# Patient Record
Sex: MALE | Race: WHITE | NOT HISPANIC OR LATINO | Employment: OTHER | URBAN - METROPOLITAN AREA
[De-identification: names, ages, dates, MRNs, and addresses within clinical notes are randomized per-mention and may not be internally consistent; named-entity substitution may affect disease eponyms.]

---

## 2023-05-22 DIAGNOSIS — I10 PRIMARY HYPERTENSION: ICD-10-CM

## 2023-05-22 RX ORDER — LOSARTAN POTASSIUM 25 MG/1
1 TABLET ORAL DAILY
COMMUNITY
Start: 2018-06-28 | End: 2023-10-24 | Stop reason: SDUPTHER

## 2023-05-22 RX ORDER — FELODIPINE 10 MG/1
10 TABLET, EXTENDED RELEASE ORAL DAILY
Qty: 90 TABLET | Refills: 3 | Status: SHIPPED | OUTPATIENT
Start: 2023-05-22 | End: 2024-04-16 | Stop reason: ALTCHOICE

## 2023-05-22 RX ORDER — ASCORBIC ACID 125 MG
5 TABLET,CHEWABLE ORAL NIGHTLY
COMMUNITY
Start: 2020-07-17

## 2023-05-22 RX ORDER — NYSTATIN AND TRIAMCINOLONE ACETONIDE 100000; 1 [USP'U]/G; MG/G
OINTMENT TOPICAL
COMMUNITY
Start: 2022-10-07 | End: 2023-11-22 | Stop reason: SDUPTHER

## 2023-05-22 RX ORDER — TRIAMCINOLONE ACETONIDE 1 MG/G
OINTMENT TOPICAL
COMMUNITY
Start: 2021-01-12 | End: 2024-03-18 | Stop reason: ALTCHOICE

## 2023-05-22 RX ORDER — SIMVASTATIN 20 MG/1
1 TABLET, FILM COATED ORAL NIGHTLY
COMMUNITY
Start: 2019-05-22 | End: 2023-11-27 | Stop reason: SDUPTHER

## 2023-05-22 RX ORDER — GLUCOSAM/CHONDRO/HERB 149/HYAL 750-100 MG
TABLET ORAL
COMMUNITY

## 2023-05-22 RX ORDER — PNV NO.95/FERROUS FUM/FOLIC AC 28MG-0.8MG
1 TABLET ORAL DAILY
COMMUNITY
Start: 2020-07-17

## 2023-05-22 RX ORDER — GEMFIBROZIL 600 MG/1
1 TABLET, FILM COATED ORAL 2 TIMES DAILY
COMMUNITY
Start: 2012-09-11 | End: 2023-10-24 | Stop reason: SDUPTHER

## 2023-05-22 RX ORDER — NEOMYCIN SULFATE, POLYMYXIN B SULFATE AND HYDROCORTISONE 10; 3.5; 1 MG/ML; MG/ML; [USP'U]/ML
SUSPENSION/ DROPS AURICULAR (OTIC)
COMMUNITY
Start: 2022-07-22

## 2023-05-22 RX ORDER — MV-MIN/FOLIC/K1/LYCOPEN/LUTEIN 300-60 MCG
1 TABLET ORAL DAILY
COMMUNITY
Start: 2020-07-17 | End: 2024-03-18 | Stop reason: SDUPTHER

## 2023-05-22 RX ORDER — FELODIPINE 10 MG/1
1 TABLET, EXTENDED RELEASE ORAL DAILY
COMMUNITY
Start: 2019-04-15 | End: 2023-05-22 | Stop reason: SDUPTHER

## 2023-05-22 RX ORDER — METOPROLOL TARTRATE 100 MG/1
TABLET ORAL
COMMUNITY
Start: 2012-08-29 | End: 2023-11-20 | Stop reason: SDUPTHER

## 2023-05-22 RX ORDER — TOBRAMYCIN 3 MG/ML
SOLUTION/ DROPS OPHTHALMIC
COMMUNITY
Start: 2022-07-22

## 2023-07-21 ENCOUNTER — OFFICE VISIT (OUTPATIENT)
Dept: PRIMARY CARE | Facility: CLINIC | Age: 78
End: 2023-07-21
Payer: MEDICARE

## 2023-07-21 VITALS
OXYGEN SATURATION: 98 % | SYSTOLIC BLOOD PRESSURE: 128 MMHG | HEIGHT: 75 IN | WEIGHT: 187.4 LBS | BODY MASS INDEX: 23.3 KG/M2 | DIASTOLIC BLOOD PRESSURE: 72 MMHG | RESPIRATION RATE: 16 BRPM | HEART RATE: 72 BPM | TEMPERATURE: 98.1 F

## 2023-07-21 DIAGNOSIS — R73.9 HYPERGLYCEMIA: Primary | ICD-10-CM

## 2023-07-21 DIAGNOSIS — I48.91 ATRIAL FIBRILLATION, UNSPECIFIED TYPE (MULTI): ICD-10-CM

## 2023-07-21 DIAGNOSIS — L25.9 CONTACT DERMATITIS AND ECZEMA: ICD-10-CM

## 2023-07-21 DIAGNOSIS — I10 PRIMARY HYPERTENSION: ICD-10-CM

## 2023-07-21 DIAGNOSIS — Z00.00 MEDICARE ANNUAL WELLNESS VISIT, SUBSEQUENT: ICD-10-CM

## 2023-07-21 DIAGNOSIS — B35.6 TINEA CRURIS: ICD-10-CM

## 2023-07-21 DIAGNOSIS — Z12.5 SCREENING PSA (PROSTATE SPECIFIC ANTIGEN): ICD-10-CM

## 2023-07-21 PROBLEM — E78.00 HIGH CHOLESTEROL: Status: ACTIVE | Noted: 2023-07-21

## 2023-07-21 PROBLEM — H01.009 BLEPHARITIS: Status: ACTIVE | Noted: 2023-07-21

## 2023-07-21 PROBLEM — H52.7 REFRACTIVE ERROR: Status: ACTIVE | Noted: 2023-07-21

## 2023-07-21 PROBLEM — R73.09 ELEVATED GLUCOSE: Status: ACTIVE | Noted: 2023-07-21

## 2023-07-21 PROBLEM — C61 PROSTATE CANCER (MULTI): Status: ACTIVE | Noted: 2023-07-21

## 2023-07-21 PROBLEM — H11.159 PINGUECULA: Status: ACTIVE | Noted: 2023-07-21

## 2023-07-21 PROBLEM — H43.813 PVD (POSTERIOR VITREOUS DETACHMENT), BOTH EYES: Status: ACTIVE | Noted: 2023-07-21

## 2023-07-21 PROBLEM — G47.00 INSOMNIA: Status: ACTIVE | Noted: 2023-07-21

## 2023-07-21 PROBLEM — H25.13 NUCLEAR SCLEROSIS OF BOTH EYES: Status: ACTIVE | Noted: 2023-07-21

## 2023-07-21 PROBLEM — F40.243 ANXIETY WITH FLYING: Status: ACTIVE | Noted: 2023-07-21

## 2023-07-21 PROBLEM — H26.9 CATARACT: Status: ACTIVE | Noted: 2023-07-21

## 2023-07-21 PROBLEM — N18.30 CHRONIC KIDNEY DISEASE, STAGE 3 (MULTI): Status: ACTIVE | Noted: 2023-07-21

## 2023-07-21 PROBLEM — N52.9 ED (ERECTILE DYSFUNCTION): Status: ACTIVE | Noted: 2023-07-21

## 2023-07-21 PROBLEM — H53.10 EYESTRAIN, BILATERAL: Status: ACTIVE | Noted: 2023-07-21

## 2023-07-21 PROBLEM — H35.363 DRUSEN STAGE MACULAR DEGENERATION OF BOTH EYES: Status: ACTIVE | Noted: 2023-07-21

## 2023-07-21 PROBLEM — R20.0 NUMBNESS AND TINGLING: Status: ACTIVE | Noted: 2023-07-21

## 2023-07-21 PROBLEM — M19.90 OSTEOARTHRITIS: Status: ACTIVE | Noted: 2023-07-21

## 2023-07-21 PROBLEM — H11.30 SCH (SUBCONJUNCTIVAL HEMORRHAGE): Status: ACTIVE | Noted: 2023-07-21

## 2023-07-21 PROBLEM — R20.2 NUMBNESS AND TINGLING: Status: ACTIVE | Noted: 2023-07-21

## 2023-07-21 PROBLEM — C44.300 SKIN CANCER OF FACE: Status: ACTIVE | Noted: 2023-07-21

## 2023-07-21 PROBLEM — H43.399 VITREOUS FLOATERS: Status: ACTIVE | Noted: 2023-07-21

## 2023-07-21 PROCEDURE — 85027 COMPLETE CBC AUTOMATED: CPT

## 2023-07-21 PROCEDURE — G0103 PSA SCREENING: HCPCS

## 2023-07-21 PROCEDURE — 80053 COMPREHEN METABOLIC PANEL: CPT

## 2023-07-21 PROCEDURE — G0439 PPPS, SUBSEQ VISIT: HCPCS | Performed by: FAMILY MEDICINE

## 2023-07-21 PROCEDURE — 80061 LIPID PANEL: CPT

## 2023-07-21 PROCEDURE — 1036F TOBACCO NON-USER: CPT | Performed by: FAMILY MEDICINE

## 2023-07-21 PROCEDURE — 3074F SYST BP LT 130 MM HG: CPT | Performed by: FAMILY MEDICINE

## 2023-07-21 PROCEDURE — 99397 PER PM REEVAL EST PAT 65+ YR: CPT | Performed by: FAMILY MEDICINE

## 2023-07-21 PROCEDURE — 3078F DIAST BP <80 MM HG: CPT | Performed by: FAMILY MEDICINE

## 2023-07-21 PROCEDURE — 83036 HEMOGLOBIN GLYCOSYLATED A1C: CPT

## 2023-07-21 PROCEDURE — 1160F RVW MEDS BY RX/DR IN RCRD: CPT | Performed by: FAMILY MEDICINE

## 2023-07-21 PROCEDURE — 1170F FXNL STATUS ASSESSED: CPT | Performed by: FAMILY MEDICINE

## 2023-07-21 PROCEDURE — G0446 INTENS BEHAVE THER CARDIO DX: HCPCS | Performed by: FAMILY MEDICINE

## 2023-07-21 PROCEDURE — 1159F MED LIST DOCD IN RCRD: CPT | Performed by: FAMILY MEDICINE

## 2023-07-21 PROCEDURE — 99214 OFFICE O/P EST MOD 30 MIN: CPT | Performed by: FAMILY MEDICINE

## 2023-07-21 RX ORDER — HYDROCORTISONE 25 MG/G
CREAM TOPICAL 2 TIMES DAILY PRN
Qty: 453.6 G | Refills: 0 | Status: SHIPPED | OUTPATIENT
Start: 2023-07-21 | End: 2024-07-20

## 2023-07-21 RX ORDER — NYSTATIN 100000 [USP'U]/G
POWDER TOPICAL 2 TIMES DAILY
Qty: 60 G | Refills: 3 | Status: SHIPPED | OUTPATIENT
Start: 2023-07-21 | End: 2024-07-20

## 2023-07-21 ASSESSMENT — ENCOUNTER SYMPTOMS
ADENOPATHY: 0
DEPRESSION: 0
SHORTNESS OF BREATH: 0
WOUND: 0
HEMATURIA: 0
RHINORRHEA: 0
LOSS OF SENSATION IN FEET: 0
EYE PAIN: 0
HALLUCINATIONS: 0
NECK STIFFNESS: 0
BACK PAIN: 0
PALPITATIONS: 0
WEAKNESS: 0
DYSURIA: 0
OCCASIONAL FEELINGS OF UNSTEADINESS: 0
POLYDIPSIA: 0
CHILLS: 0
EYE REDNESS: 0
HEADACHES: 0
BRUISES/BLEEDS EASILY: 0
FEVER: 0
SORE THROAT: 0
BLOOD IN STOOL: 0
FATIGUE: 0
COUGH: 0
ABDOMINAL PAIN: 0

## 2023-07-21 ASSESSMENT — ACTIVITIES OF DAILY LIVING (ADL)
TAKING_MEDICATION: INDEPENDENT
MANAGING_FINANCES: INDEPENDENT
BATHING: INDEPENDENT
GROCERY_SHOPPING: INDEPENDENT
DRESSING: INDEPENDENT
DOING_HOUSEWORK: INDEPENDENT

## 2023-07-21 ASSESSMENT — PATIENT HEALTH QUESTIONNAIRE - PHQ9
2. FEELING DOWN, DEPRESSED OR HOPELESS: NOT AT ALL
SUM OF ALL RESPONSES TO PHQ9 QUESTIONS 1 AND 2: 0
1. LITTLE INTEREST OR PLEASURE IN DOING THINGS: NOT AT ALL

## 2023-07-21 NOTE — PATIENT INSTRUCTIONS
Try Oil of Olay Total Skin effects -- 7    Try COLACE (docusate) 100 mg -- start with 2 capsules daily.

## 2023-07-21 NOTE — ASSESSMENT & PLAN NOTE
For facial rash -- add hydrocortisone 2.5% cream up to twice a day (including after shaving)    STOP aftershave tonics    Can use Oil of Olay Total Skin Effects (7) once acute inflammation calms down.

## 2023-07-21 NOTE — PROGRESS NOTES
"Subjective   Reason for Visit: Jhonny Baron is an 77 y.o. male here for a Medicare Wellness visit.     Past Medical, Surgical, and Family History reviewed and updated in chart.    Reviewed all medications by prescribing practitioner or clinical pharmacist (such as prescriptions, OTCs, herbal therapies and supplements) and documented in the medical record.        Patient Care Team:  Valdo Arango MD as PCP - General     Review of Systems   Constitutional:  Negative for chills, fatigue and fever.   HENT:  Negative for rhinorrhea and sore throat.    Eyes:  Negative for pain and redness.   Respiratory:  Negative for cough and shortness of breath.    Cardiovascular:  Negative for chest pain and palpitations.   Gastrointestinal:  Negative for abdominal pain and blood in stool.   Endocrine: Negative for polydipsia and polyuria.   Genitourinary:  Negative for dysuria and hematuria.   Musculoskeletal:  Negative for back pain and neck stiffness.   Skin:  Positive for rash. Negative for wound.   Allergic/Immunologic: Negative for environmental allergies and food allergies.   Neurological:  Negative for weakness and headaches.   Hematological:  Negative for adenopathy. Does not bruise/bleed easily.   Psychiatric/Behavioral:  Negative for hallucinations and suicidal ideas.        Objective   Vitals:  /72 (BP Location: Left arm, Patient Position: Sitting, BP Cuff Size: Adult)   Pulse 72   Temp 36.7 °C (98.1 °F) (Temporal)   Resp 16   Ht 1.905 m (6' 3\")   Wt 85 kg (187 lb 6.4 oz)   SpO2 98%   BMI 23.42 kg/m²       Physical Exam  Vitals reviewed.   Constitutional:       General: He is not in acute distress.     Appearance: He is not ill-appearing.   HENT:      Head: Normocephalic and atraumatic.      Right Ear: Tympanic membrane normal.      Left Ear: Tympanic membrane normal.      Nose: No congestion or rhinorrhea.      Mouth/Throat:      Pharynx: No oropharyngeal exudate or posterior oropharyngeal erythema. "   Eyes:      Extraocular Movements: Extraocular movements intact.      Conjunctiva/sclera: Conjunctivae normal.      Pupils: Pupils are equal, round, and reactive to light.   Cardiovascular:      Rate and Rhythm: Normal rate. Rhythm irregular.      Heart sounds: No murmur heard.     No friction rub. No gallop.   Pulmonary:      Effort: Pulmonary effort is normal.      Breath sounds: Normal breath sounds. No wheezing, rhonchi or rales.   Abdominal:      General: There is no distension.      Palpations: Abdomen is soft.      Tenderness: There is no abdominal tenderness. There is no guarding or rebound.   Musculoskeletal:         General: No swelling or deformity.      Cervical back: Normal range of motion and neck supple.      Right lower leg: No edema.      Left lower leg: No edema.   Skin:     Capillary Refill: Capillary refill takes less than 2 seconds.      Coloration: Skin is not jaundiced.      Findings: Rash present.   Neurological:      General: No focal deficit present.      Mental Status: He is alert.      Motor: No weakness.   Psychiatric:         Mood and Affect: Mood normal.         Behavior: Behavior normal.         Assessment/Plan   Problem List Items Addressed This Visit       Atrial fibrillation (CMS/HCC)    Overview     AFIB -- rate controlled.   Xarelto for DOAC.   F/U cardio Dr Darnell.         Contact dermatitis and eczema    Overview     Contact dermatitis vs Xerotic eczema -- treated with oral CS 12/2020.   Per path stable with regular use of aveeno.   Using triamcinolone cream/ointment for acute flare-ups.   Continue using DOVE soap -- nothing stronger.         Current Assessment & Plan     For facial rash -- add hydrocortisone 2.5% cream up to twice a day (including after shaving)    STOP aftershave tonics    Can use Oil of Olay Total Skin Effects (7) once acute inflammation calms down.          Relevant Medications    hydrocortisone 2.5 % cream    Hyperglycemia - Primary    Current Assessment &  Plan     Monitor a1c.         Hypertension    Overview                 Current Assessment & Plan     well controlled on CCB, ACEi, BB.         Medicare annual wellness visit, subsequent    Overview     7/21/23 [facility] Preventive exam: check labs, continue healthy diet and regular exercise.  No further colonoscopies after age 75.  PSA monitored by Urology.     7/21/23 SUBSEQUENT Medicare wellness eval -- rare cigar, no falls, no depression, no dementia, no loss of adls. moderate alcohol. no opiates. only rare benzo when flying -- no longer needs this.      7/21/23 I spent 15 minutes face-to-face with this individual discussing their cardiovascular risk and behavioral therapies of nutritional choices, exercise, and elimination of habits contributing to risk. We agreed on a plan addressing reduction of their current cardiovascular risk. For patients with risk calculated >10%, aspirin use was discussed and encouraged unless known allergy or increased risk of bleeding contraindicates use. Patient's 10 year CV risk estimate calculates to: >10 %      Hx Low TASHA on screening test -- no sx of claudication. Normal pulses on exam. Hold off on formal ABIs/arterial doppler testing unless symptoms present.                 Other Visit Diagnoses       Tinea cruris        Relevant Medications    nystatin (Mycostatin) 100,000 unit/gram powder

## 2023-07-22 LAB
ALANINE AMINOTRANSFERASE (SGPT) (U/L) IN SER/PLAS: 17 U/L (ref 10–52)
ALBUMIN (G/DL) IN SER/PLAS: 5.1 G/DL (ref 3.4–5)
ALKALINE PHOSPHATASE (U/L) IN SER/PLAS: 68 U/L (ref 33–136)
ANION GAP IN SER/PLAS: 15 MMOL/L (ref 10–20)
ASPARTATE AMINOTRANSFERASE (SGOT) (U/L) IN SER/PLAS: 25 U/L (ref 9–39)
BILIRUBIN TOTAL (MG/DL) IN SER/PLAS: 1 MG/DL (ref 0–1.2)
CALCIUM (MG/DL) IN SER/PLAS: 10.3 MG/DL (ref 8.6–10.6)
CARBON DIOXIDE, TOTAL (MMOL/L) IN SER/PLAS: 24 MMOL/L (ref 21–32)
CHLORIDE (MMOL/L) IN SER/PLAS: 106 MMOL/L (ref 98–107)
CHOLESTEROL (MG/DL) IN SER/PLAS: 111 MG/DL (ref 0–199)
CHOLESTEROL IN HDL (MG/DL) IN SER/PLAS: 31.5 MG/DL
CHOLESTEROL/HDL RATIO: 3.5
CREATININE (MG/DL) IN SER/PLAS: 1.27 MG/DL (ref 0.5–1.3)
ERYTHROCYTE DISTRIBUTION WIDTH (RATIO) BY AUTOMATED COUNT: 14.6 % (ref 11.5–14.5)
ERYTHROCYTE MEAN CORPUSCULAR HEMOGLOBIN CONCENTRATION (G/DL) BY AUTOMATED: 30.9 G/DL (ref 32–36)
ERYTHROCYTE MEAN CORPUSCULAR VOLUME (FL) BY AUTOMATED COUNT: 101 FL (ref 80–100)
ERYTHROCYTES (10*6/UL) IN BLOOD BY AUTOMATED COUNT: 4.3 X10E12/L (ref 4.5–5.9)
ESTIMATED AVERAGE GLUCOSE FOR HBA1C: 140 MG/DL
GFR MALE: 57 ML/MIN/1.73M2
GLUCOSE (MG/DL) IN SER/PLAS: 103 MG/DL (ref 74–99)
HEMATOCRIT (%) IN BLOOD BY AUTOMATED COUNT: 43.4 % (ref 41–52)
HEMOGLOBIN (G/DL) IN BLOOD: 13.4 G/DL (ref 13.5–17.5)
HEMOGLOBIN A1C/HEMOGLOBIN TOTAL IN BLOOD: 6.5 %
LDL: 49 MG/DL (ref 0–99)
LEUKOCYTES (10*3/UL) IN BLOOD BY AUTOMATED COUNT: 7.8 X10E9/L (ref 4.4–11.3)
NRBC (PER 100 WBCS) BY AUTOMATED COUNT: 0 /100 WBC (ref 0–0)
PLATELETS (10*3/UL) IN BLOOD AUTOMATED COUNT: 604 X10E9/L (ref 150–450)
POTASSIUM (MMOL/L) IN SER/PLAS: 4.5 MMOL/L (ref 3.5–5.3)
PROSTATE SPECIFIC AG (NG/ML) IN SER/PLAS: <0.1 NG/ML (ref 0–4)
PROTEIN TOTAL: 7.3 G/DL (ref 6.4–8.2)
SODIUM (MMOL/L) IN SER/PLAS: 140 MMOL/L (ref 136–145)
TRIGLYCERIDE (MG/DL) IN SER/PLAS: 155 MG/DL (ref 0–149)
UREA NITROGEN (MG/DL) IN SER/PLAS: 32 MG/DL (ref 6–23)
VLDL: 31 MG/DL (ref 0–40)

## 2023-07-27 DIAGNOSIS — R73.03 PREDIABETES: ICD-10-CM

## 2023-07-28 RX ORDER — METFORMIN HYDROCHLORIDE 500 MG/1
1000 TABLET, EXTENDED RELEASE ORAL
Qty: 60 TABLET | Refills: 11 | Status: SHIPPED | OUTPATIENT
Start: 2023-07-28 | End: 2023-11-22 | Stop reason: SDUPTHER

## 2023-08-09 ENCOUNTER — TELEPHONE (OUTPATIENT)
Dept: PRIMARY CARE | Facility: CLINIC | Age: 78
End: 2023-08-09
Payer: MEDICARE

## 2023-08-09 DIAGNOSIS — E78.00 HIGH CHOLESTEROL: ICD-10-CM

## 2023-08-09 DIAGNOSIS — R73.9 HYPERGLYCEMIA: ICD-10-CM

## 2023-08-09 NOTE — TELEPHONE ENCOUNTER
Medardo Baron,  1945, was in a couple of weeks ago regarding his sugar levels and wanted to talk to you about this as well as his cholesterol levels and the possibility of doing a blood check. Please call him back at [phone]. Thanks

## 2023-08-10 NOTE — TELEPHONE ENCOUNTER
Spoke with the patient he had concerns about when to check again his A1c I advised he earliest to check will be 3 months and that will give Dr Arango an Idea if the medication is working. The patient agreed and will call back for labs later.

## 2023-08-15 LAB
CHOLESTEROL (MG/DL) IN SER/PLAS: 94 MG/DL (ref 0–199)
CHOLESTEROL IN HDL (MG/DL) IN SER/PLAS: 27.7 MG/DL
CHOLESTEROL/HDL RATIO: 3.4
LDL: 47 MG/DL (ref 0–99)
TRIGLYCERIDE (MG/DL) IN SER/PLAS: 97 MG/DL (ref 0–149)
VLDL: 19 MG/DL (ref 0–40)

## 2023-09-13 PROBLEM — L30.9 DERMATITIS, UNSPECIFIED: Status: ACTIVE | Noted: 2023-03-28

## 2023-09-13 PROBLEM — L90.5 SCAR CONDITION AND FIBROSIS OF SKIN: Status: ACTIVE | Noted: 2023-03-28

## 2023-09-13 PROBLEM — I44.0 FIRST DEGREE ATRIOVENTRICULAR BLOCK: Status: ACTIVE | Noted: 2023-09-13

## 2023-09-13 PROBLEM — L40.9 PSORIASIS: Status: ACTIVE | Noted: 2023-09-13

## 2023-09-13 PROBLEM — D48.5 NEOPLASM OF UNCERTAIN BEHAVIOR OF SKIN: Status: ACTIVE | Noted: 2023-03-28

## 2023-09-13 PROBLEM — D18.01 HEMANGIOMA OF SKIN AND SUBCUTANEOUS TISSUE: Status: ACTIVE | Noted: 2023-03-28

## 2023-09-13 PROBLEM — I87.2 CHRONIC VENOUS INSUFFICIENCY: Status: ACTIVE | Noted: 2023-03-28

## 2023-09-13 PROBLEM — Z85.828 PERSONAL HISTORY OF OTHER MALIGNANT NEOPLASM OF SKIN: Status: ACTIVE | Noted: 2023-03-28

## 2023-09-13 PROBLEM — I45.10 RIGHT BUNDLE BRANCH BLOCK: Status: ACTIVE | Noted: 2023-09-13

## 2023-09-13 PROBLEM — L71.9 ROSACEA, UNSPECIFIED: Status: ACTIVE | Noted: 2023-03-28

## 2023-09-13 PROBLEM — D22.5 MELANOCYTIC NEVI OF TRUNK: Status: ACTIVE | Noted: 2023-03-28

## 2023-09-13 PROBLEM — L57.0 ACTINIC KERATOSIS: Status: ACTIVE | Noted: 2023-03-28

## 2023-09-13 PROBLEM — L20.9 ATOPIC DERMATITIS, UNSPECIFIED: Status: ACTIVE | Noted: 2023-03-28

## 2023-09-13 RX ORDER — KETOCONAZOLE 20 MG/G
1 CREAM TOPICAL
COMMUNITY
Start: 2022-09-20 | End: 2024-03-18

## 2023-09-13 RX ORDER — CLOBETASOL PROPIONATE 0.46 MG/ML
1 SOLUTION TOPICAL
COMMUNITY
Start: 2023-03-14 | End: 2024-03-18

## 2023-09-13 RX ORDER — TALC
3 POWDER (GRAM) TOPICAL DAILY
COMMUNITY
End: 2023-11-22 | Stop reason: ALTCHOICE

## 2023-09-13 RX ORDER — METRONIDAZOLE 7.5 MG/G
1 CREAM TOPICAL
COMMUNITY
Start: 2021-10-20 | End: 2024-03-18

## 2023-09-13 RX ORDER — FLUOROURACIL 50 MG/G
1 CREAM TOPICAL
COMMUNITY
Start: 2022-01-19 | End: 2024-03-18

## 2023-10-05 ENCOUNTER — TELEPHONE (OUTPATIENT)
Dept: PRIMARY CARE | Facility: CLINIC | Age: 78
End: 2023-10-05
Payer: MEDICARE

## 2023-10-05 NOTE — TELEPHONE ENCOUNTER
Patient left message re: prescription you are working on to get approved ? I looked in his chart but did not see anything. I called patient and l/m that you would be out until Monday and that you would address then or he could call me back and I can look into it for him.

## 2023-10-24 DIAGNOSIS — E78.00 HIGH CHOLESTEROL: Primary | ICD-10-CM

## 2023-10-24 RX ORDER — LOSARTAN POTASSIUM 25 MG/1
25 TABLET ORAL DAILY
Qty: 90 TABLET | Refills: 3 | Status: SHIPPED | OUTPATIENT
Start: 2023-10-24

## 2023-10-24 RX ORDER — GEMFIBROZIL 600 MG/1
600 TABLET, FILM COATED ORAL 2 TIMES DAILY
Qty: 90 TABLET | Refills: 3 | Status: SHIPPED | OUTPATIENT
Start: 2023-10-24 | End: 2024-04-29 | Stop reason: SDUPTHER

## 2023-10-25 ENCOUNTER — OFFICE VISIT (OUTPATIENT)
Dept: OPHTHALMOLOGY | Facility: CLINIC | Age: 78
End: 2023-10-25
Payer: MEDICARE

## 2023-10-25 DIAGNOSIS — H35.363 DEGENERATIVE RETINAL DRUSEN OF BOTH EYES: Primary | ICD-10-CM

## 2023-10-25 DIAGNOSIS — H25.13 NUCLEAR SCLEROSIS OF BOTH EYES: ICD-10-CM

## 2023-10-25 DIAGNOSIS — H52.13 MYOPIA OF BOTH EYES: ICD-10-CM

## 2023-10-25 DIAGNOSIS — H35.363 DRUSEN STAGE MACULAR DEGENERATION OF BOTH EYES: ICD-10-CM

## 2023-10-25 DIAGNOSIS — H43.393 VITREOUS FLOATERS OF BOTH EYES: ICD-10-CM

## 2023-10-25 DIAGNOSIS — H52.4 PRESBYOPIA OF BOTH EYES: ICD-10-CM

## 2023-10-25 PROCEDURE — 92015 DETERMINE REFRACTIVE STATE: CPT | Performed by: OPTOMETRIST

## 2023-10-25 PROCEDURE — 92014 COMPRE OPH EXAM EST PT 1/>: CPT | Performed by: OPTOMETRIST

## 2023-10-25 PROCEDURE — 92134 CPTRZ OPH DX IMG PST SGM RTA: CPT | Mod: BILATERAL PROCEDURE | Performed by: OPTOMETRIST

## 2023-10-25 ASSESSMENT — CONF VISUAL FIELD
OS_SUPERIOR_TEMPORAL_RESTRICTION: 0
OD_INFERIOR_NASAL_RESTRICTION: 0
OD_SUPERIOR_TEMPORAL_RESTRICTION: 0
OS_INFERIOR_TEMPORAL_RESTRICTION: 0
OD_NORMAL: 1
OS_NORMAL: 1
OS_SUPERIOR_NASAL_RESTRICTION: 0
OD_SUPERIOR_NASAL_RESTRICTION: 0
OS_INFERIOR_NASAL_RESTRICTION: 0
OD_INFERIOR_TEMPORAL_RESTRICTION: 0

## 2023-10-25 ASSESSMENT — ENCOUNTER SYMPTOMS
ENDOCRINE NEGATIVE: 0
GASTROINTESTINAL NEGATIVE: 0
EYES NEGATIVE: 0
RESPIRATORY NEGATIVE: 0
MUSCULOSKELETAL NEGATIVE: 0
PSYCHIATRIC NEGATIVE: 0
CARDIOVASCULAR NEGATIVE: 0
NEUROLOGICAL NEGATIVE: 0
ALLERGIC/IMMUNOLOGIC NEGATIVE: 0
HEMATOLOGIC/LYMPHATIC NEGATIVE: 0
CONSTITUTIONAL NEGATIVE: 0

## 2023-10-25 ASSESSMENT — EXTERNAL EXAM - LEFT EYE: OS_EXAM: NORMAL

## 2023-10-25 ASSESSMENT — REFRACTION_WEARINGRX
OS_SPHERE: -1.50
OD_CYLINDER: -2.25
OS_CYLINDER: -2.25
OS_AXIS: 077
OS_ADD: +2.75
OD_AXIS: 110
OD_SPHERE: -1.50
OD_ADD: +2.75

## 2023-10-25 ASSESSMENT — CUP TO DISC RATIO
OS_RATIO: 0.5
OD_RATIO: 0.4

## 2023-10-25 ASSESSMENT — REFRACTION_MANIFEST
OD_SPHERE: -1.00
OD_CYLINDER: -2.25
OD_AXIS: 110
OS_SPHERE: -1.25
OD_ADD: +2.75
OS_AXIS: 077
OS_ADD: +2.75
OS_CYLINDER: -2.25

## 2023-10-25 ASSESSMENT — VISUAL ACUITY
OD_CC: 20/30
OS_CC+: -2
OS_CC: 20/20
OD_CC+: -2
METHOD: SNELLEN - LINEAR

## 2023-10-25 ASSESSMENT — TONOMETRY
OS_IOP_MMHG: 16
IOP_METHOD: GOLDMANN APPLANATION
OD_IOP_MMHG: 16

## 2023-10-25 ASSESSMENT — SLIT LAMP EXAM - LIDS
COMMENTS: DERMATOCHALASIS - UPPER LID, BLEPHARITIS
COMMENTS: DERMATOCHALASIS - UPPER LID, BLEPHARITIS

## 2023-10-25 ASSESSMENT — EXTERNAL EXAM - RIGHT EYE: OD_EXAM: NORMAL

## 2023-10-25 NOTE — PROGRESS NOTES
Va corrects to 20/25 OD and 20/20 OS and was 20/20 right eye (OD)/left eye (OS) and minor cataracts will mild PSC OS new and may have caused mild refractive change OS as well as very minor drusen stage AMD OS>OD.  No need for AREDS 2 at this stage does not neet AREDS criteria.  A spectacle prescription was dispensed to be used as needed. Optical coherence tomography of the macula revealed:   OD: Normal foveal contour, photoreceptor, retinal pigment epithelium, IS/OS junction, central field 280 was 278 micron.  Vitreous hyaloid base not visualized.  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  OS:  Normal foveal contour, photoreceptor, retinal pigment epithelium, IS/OS junction, central field 286 was 286 micron.  Vitreous hyaloid base not visualized.  Findings are normal.  One single drusen observed temporal to fovea.  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  RNFL OCT was done as well and nl.  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    Discussed can DC AREDS 2 vits.  A spectacle prescription was dispensed to be used as needed.  The patient was asked to return to our clinic in one year or sooner if ocular or vision changes occur.

## 2023-10-31 ENCOUNTER — OFFICE VISIT (OUTPATIENT)
Dept: DERMATOLOGY | Facility: CLINIC | Age: 78
End: 2023-10-31
Payer: MEDICARE

## 2023-10-31 DIAGNOSIS — L30.9 DERMATITIS: Primary | ICD-10-CM

## 2023-10-31 PROCEDURE — 3075F SYST BP GE 130 - 139MM HG: CPT | Performed by: NURSE PRACTITIONER

## 2023-10-31 PROCEDURE — 3078F DIAST BP <80 MM HG: CPT | Performed by: NURSE PRACTITIONER

## 2023-10-31 PROCEDURE — 99213 OFFICE O/P EST LOW 20 MIN: CPT | Performed by: NURSE PRACTITIONER

## 2023-10-31 PROCEDURE — 1160F RVW MEDS BY RX/DR IN RCRD: CPT | Performed by: NURSE PRACTITIONER

## 2023-10-31 PROCEDURE — 1036F TOBACCO NON-USER: CPT | Performed by: NURSE PRACTITIONER

## 2023-10-31 PROCEDURE — 1159F MED LIST DOCD IN RCRD: CPT | Performed by: NURSE PRACTITIONER

## 2023-10-31 ASSESSMENT — DERMATOLOGY QUALITY OF LIFE (QOL) ASSESSMENT
RATE HOW BOTHERED YOU ARE BY EFFECTS OF YOUR SKIN PROBLEMS ON YOUR ACTIVITIES (EG, GOING OUT, ACCOMPLISHING WHAT YOU WANT, WORK ACTIVITIES OR YOUR RELATIONSHIPS WITH OTHERS): 1
RATE HOW EMOTIONALLY BOTHERED YOU ARE BY YOUR SKIN PROBLEM (FOR EXAMPLE, WORRY, EMBARRASSMENT, FRUSTRATION): 1
WHAT SINGLE SKIN CONDITION LISTED BELOW IS THE PATIENT ANSWERING THE QUALITY-OF-LIFE ASSESSMENT QUESTIONS ABOUT: NONE OF THE ABOVE
RATE HOW BOTHERED YOU ARE BY SYMPTOMS OF YOUR SKIN PROBLEM (EG, ITCHING, STINGING BURNING, HURTING OR SKIN IRRITATION): 3

## 2023-10-31 ASSESSMENT — ITCH NUMERIC RATING SCALE: HOW SEVERE IS YOUR ITCHING?: 4

## 2023-10-31 ASSESSMENT — PATIENT GLOBAL ASSESSMENT (PGA): PATIENT GLOBAL ASSESSMENT: PATIENT GLOBAL ASSESSMENT:  2 - MILD

## 2023-10-31 NOTE — PROGRESS NOTES
Subjective     Jhonny Baron is a 77 y.o. male who presents for the following: Rash.     Review of Systems:  No other skin or systemic complaints other than what is documented elsewhere in the note.    The following portions of the chart were reviewed this encounter and updated as appropriate:   Tobacco  Allergies  Meds  Problems  Med Hx  Surg Hx         Skin Cancer History  No skin cancer on file.      Specialty Problems          Dermatology Problems    Actinic keratosis    Atopic dermatitis, unspecified    Dermatitis, unspecified    Hemangioma of skin and subcutaneous tissue    Melanocytic nevi of trunk    Neoplasm of uncertain behavior of skin    Personal history of other malignant neoplasm of skin    Rosacea, unspecified    Scar condition and fibrosis of skin    Contact dermatitis and eczema     Contact dermatitis vs Xerotic eczema -- treated with oral CS 12/2020.   Per path stable with regular use of aveeno.   Using triamcinolone cream/ointment for acute flare-ups.   Continue using DOVE soap -- nothing stronger.         Skin cancer of face    Psoriasis        Objective   Well appearing patient in no apparent distress; mood and affect are within normal limits.    A focused skin examination was performed. All findings within normal limits unless otherwise noted below.    Assessment/Plan   1. Dermatitis  Head - Anterior (Face), Left Popliteal Fossa, Right Popliteal Fossa  [place] area has mild redness to more pink mixture of papules and patches    This is a 77 y.o. male  following up for dermatitis. Face is ~50% better. Patient is content. Itch is more 2/10 per patient right now. He is applying HTC 2.5% cream every other day but is too busy to apply daily. He is content with control. Discussed given him stronger TCS to desonide, but patient declined. Advised areas on popliteal fossa 2/2 to dermatitis as well and can apply HTC 2.5% cream there as well. He verbalized understanding and agreement. RTC in 4-5  months for routine skin check.

## 2023-11-20 DIAGNOSIS — I10 PRIMARY HYPERTENSION: Primary | ICD-10-CM

## 2023-11-20 RX ORDER — METOPROLOL TARTRATE 100 MG/1
50 TABLET ORAL 2 TIMES DAILY
Qty: 30 TABLET | Refills: 11 | Status: SHIPPED | OUTPATIENT
Start: 2023-11-20 | End: 2024-11-19

## 2023-11-22 ENCOUNTER — LAB (OUTPATIENT)
Dept: LAB | Facility: LAB | Age: 78
End: 2023-11-22
Payer: MEDICARE

## 2023-11-22 ENCOUNTER — OFFICE VISIT (OUTPATIENT)
Dept: PRIMARY CARE | Facility: CLINIC | Age: 78
End: 2023-11-22
Payer: MEDICARE

## 2023-11-22 VITALS
TEMPERATURE: 98.1 F | DIASTOLIC BLOOD PRESSURE: 70 MMHG | BODY MASS INDEX: 21.25 KG/M2 | OXYGEN SATURATION: 98 % | RESPIRATION RATE: 16 BRPM | HEART RATE: 62 BPM | SYSTOLIC BLOOD PRESSURE: 130 MMHG | WEIGHT: 170 LBS

## 2023-11-22 DIAGNOSIS — C61 PROSTATE CANCER (MULTI): ICD-10-CM

## 2023-11-22 DIAGNOSIS — R73.03 PREDIABETES: ICD-10-CM

## 2023-11-22 DIAGNOSIS — I10 PRIMARY HYPERTENSION: ICD-10-CM

## 2023-11-22 DIAGNOSIS — E11.9 TYPE 2 DIABETES MELLITUS WITHOUT COMPLICATION, WITHOUT LONG-TERM CURRENT USE OF INSULIN (MULTI): Primary | ICD-10-CM

## 2023-11-22 DIAGNOSIS — R63.4 WEIGHT LOSS: ICD-10-CM

## 2023-11-22 DIAGNOSIS — R73.9 HYPERGLYCEMIA: ICD-10-CM

## 2023-11-22 DIAGNOSIS — E11.9 TYPE 2 DIABETES MELLITUS WITHOUT COMPLICATION, WITHOUT LONG-TERM CURRENT USE OF INSULIN (MULTI): ICD-10-CM

## 2023-11-22 DIAGNOSIS — L30.9 DERMATITIS, UNSPECIFIED: ICD-10-CM

## 2023-11-22 DIAGNOSIS — E78.00 HIGH CHOLESTEROL: ICD-10-CM

## 2023-11-22 LAB
ALBUMIN SERPL BCP-MCNC: 4.9 G/DL (ref 3.4–5)
ALP SERPL-CCNC: 73 U/L (ref 33–136)
ALT SERPL W P-5'-P-CCNC: 16 U/L (ref 10–52)
ANION GAP SERPL CALC-SCNC: 13 MMOL/L (ref 10–20)
AST SERPL W P-5'-P-CCNC: 23 U/L (ref 9–39)
BILIRUB SERPL-MCNC: 0.9 MG/DL (ref 0–1.2)
BUN SERPL-MCNC: 29 MG/DL (ref 6–23)
CALCIUM SERPL-MCNC: 9.8 MG/DL (ref 8.6–10.3)
CHLORIDE SERPL-SCNC: 107 MMOL/L (ref 98–107)
CO2 SERPL-SCNC: 27 MMOL/L (ref 21–32)
CREAT SERPL-MCNC: 1.08 MG/DL (ref 0.5–1.3)
EST. AVERAGE GLUCOSE BLD GHB EST-MCNC: 123 MG/DL
GFR SERPL CREATININE-BSD FRML MDRD: 69 ML/MIN/1.73M*2
GLUCOSE SERPL-MCNC: 120 MG/DL (ref 74–99)
HBA1C MFR BLD: 5.9 %
POTASSIUM SERPL-SCNC: 4.3 MMOL/L (ref 3.5–5.3)
PROT SERPL-MCNC: 7.1 G/DL (ref 6.4–8.2)
SODIUM SERPL-SCNC: 143 MMOL/L (ref 136–145)
TSH SERPL-ACNC: 1.9 MIU/L (ref 0.44–3.98)
VIT B12 SERPL-MCNC: 470 PG/ML (ref 211–911)

## 2023-11-22 PROCEDURE — 84443 ASSAY THYROID STIM HORMONE: CPT

## 2023-11-22 PROCEDURE — 82607 VITAMIN B-12: CPT

## 2023-11-22 PROCEDURE — 80053 COMPREHEN METABOLIC PANEL: CPT

## 2023-11-22 PROCEDURE — 36415 COLL VENOUS BLD VENIPUNCTURE: CPT

## 2023-11-22 PROCEDURE — 1036F TOBACCO NON-USER: CPT | Performed by: FAMILY MEDICINE

## 2023-11-22 PROCEDURE — 83036 HEMOGLOBIN GLYCOSYLATED A1C: CPT

## 2023-11-22 PROCEDURE — 1159F MED LIST DOCD IN RCRD: CPT | Performed by: FAMILY MEDICINE

## 2023-11-22 PROCEDURE — 3075F SYST BP GE 130 - 139MM HG: CPT | Performed by: FAMILY MEDICINE

## 2023-11-22 PROCEDURE — 99214 OFFICE O/P EST MOD 30 MIN: CPT | Performed by: FAMILY MEDICINE

## 2023-11-22 PROCEDURE — 1160F RVW MEDS BY RX/DR IN RCRD: CPT | Performed by: FAMILY MEDICINE

## 2023-11-22 PROCEDURE — 3078F DIAST BP <80 MM HG: CPT | Performed by: FAMILY MEDICINE

## 2023-11-22 RX ORDER — NYSTATIN AND TRIAMCINOLONE ACETONIDE 100000; 1 [USP'U]/G; MG/G
OINTMENT TOPICAL
Qty: 15 G | Refills: 1 | Status: SHIPPED | OUTPATIENT
Start: 2023-11-22 | End: 2024-03-22 | Stop reason: SDUPTHER

## 2023-11-22 RX ORDER — METFORMIN HYDROCHLORIDE 500 MG/1
500 TABLET, EXTENDED RELEASE ORAL
Qty: 90 TABLET | Refills: 3 | Status: SHIPPED | OUTPATIENT
Start: 2023-11-22 | End: 2024-06-03 | Stop reason: SDUPTHER

## 2023-11-22 NOTE — PROGRESS NOTES
"Subjective   Patient ID: Jhonny Baron is a 78 y.o. male who presents for Follow-up (DM).    Increased eczema flare following recent COVID infection 2 weeks ago.  Cough resolving.  No SOB.    Losing weight 17# since summer -- 10% of body weight.   Getting regular exercise and watching sugar intake since dx DM 7/2023 with A1c 6.5% and started metformin.   No gi side effects.     Objective   Visit Vitals  /70 (BP Location: Right arm, Patient Position: Sitting, BP Cuff Size: Adult)   Pulse 62   Temp 36.7 °C (98.1 °F) (Temporal)   Resp 16   Wt 77.1 kg (170 lb)   SpO2 98%   BMI 21.25 kg/m²   Smoking Status Former   BSA 2.02 m²      O: VSS AFEB Awake, Alert, NAD.  No intoxication, withdrawal, or sedation.  Chest CTA.  Heart RRR.  Ext no c/c/e.      Lab Results   Component Value Date    WBC 7.8 07/21/2023    HGB 13.4 (L) 07/21/2023    HCT 43.4 07/21/2023     (H) 07/21/2023     (H) 07/21/2023       Chemistry    Lab Results   Component Value Date/Time     07/21/2023 1329    K 4.5 07/21/2023 1329     07/21/2023 1329    CO2 24 07/21/2023 1329    BUN 32 (H) 07/21/2023 1329    CREATININE 1.27 07/21/2023 1329    Lab Results   Component Value Date/Time    CALCIUM 10.3 07/21/2023 1329    ALKPHOS 68 07/21/2023 1329    AST 25 07/21/2023 1329    ALT 17 07/21/2023 1329    BILITOT 1.0 07/21/2023 1329          Lab Results   Component Value Date    CHOL 94 08/15/2023    CHOL 111 07/21/2023    CHOL 101 07/22/2022     Lab Results   Component Value Date    HDL 27.7 (A) 08/15/2023    HDL 31.5 (A) 07/21/2023    HDL 30.3 (A) 07/22/2022     Lab Results   Component Value Date    LDLCALC 63 (L) 01/16/2018     Lab Results   Component Value Date    TRIG 97 08/15/2023    TRIG 155 (H) 07/21/2023    TRIG 93 02/22/2022     No components found for: \"CHOLHDL\"   Lab Results   Component Value Date    HGBA1C 6.5 (A) 07/21/2023       Assessment/Plan   Problem List Items Addressed This Visit       High cholesterol    Overview "     Stable on simvastatin, gemfibrozil.         Current Assessment & Plan     Continue current meds.          Type 2 diabetes mellitus without complication, without long-term current use of insulin (CMS/[facility]) - Primary    Overview     Dx 7/2023 with A1c 6.5%  Metformin started 7/2023 and tolerating well.   11/2023 Cut metformin down to 1 a day d/t weight loss.          Current Assessment & Plan     Weight loss on Metformin starteed 7/2023  Monitor a1c.         Hypertension    Overview     CCB, ACEi, BB            Current Assessment & Plan     well controlled  -- continue current CCB, ACEi, BB.         Prostate cancer (CMS/[facility])    Overview     12/2006 s/p prostatectomy ([place])         Dermatitis, unspecified     Other Visit Diagnoses       Weight loss        Prediabetes

## 2023-11-27 DIAGNOSIS — E78.00 HIGH CHOLESTEROL: ICD-10-CM

## 2023-11-27 RX ORDER — SIMVASTATIN 20 MG/1
20 TABLET, FILM COATED ORAL NIGHTLY
Qty: 90 TABLET | Refills: 3 | Status: SHIPPED | OUTPATIENT
Start: 2023-11-27

## 2024-03-16 NOTE — PROGRESS NOTES
Subjective      Chief Complaint   Patient presents with    Atrial Fibrillation     6 month f/u          He does have a history atrial fibrillation as well as hypertension.  He is on Xarelto for embolic protection.  Pulled a groin muscle a month ago.  He is not complaining of chest discomfort.  NO PND or orthopnea.  The legs are not swelling on him.  He does not complain of palpitations.  The afib seems to be controlled. No dizziness or lightheadedness.           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    Atrial fibrillation (Multi) 07/21/2023    Blepharitis 07/21/2023    Cataract 07/21/2023    Chronic kidney disease, stage 3 (Multi) 07/21/2023    Contact dermatitis and eczema 07/21/2023    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 07/21/2023    ED (erectile dysfunction) 07/21/2023    Eyestrain, bilateral 07/21/2023    High cholesterol 07/21/2023    Type 2 diabetes mellitus without complication, without long-term current use of insulin (Multi) 07/21/2023    Hypertension 07/21/2023    Insomnia 07/21/2023    Nuclear sclerosis of both eyes 07/21/2023    Osteoarthritis 07/21/2023    Numbness and tingling 07/21/2023    Pinguecula 07/21/2023    Prostate cancer (Multi) 07/21/2023    PVD (posterior vitreous detachment), both eyes 07/21/2023    Refractive error 07/21/2023    OLIVE (subconjunctival hemorrhage) 07/21/2023    Skin cancer of face 07/21/2023    Vitreous floaters 07/21/2023    Medicare annual wellness visit, subsequent 07/21/2023    Atopic dermatitis, unspecified 03/28/2023    Chronic venous  insufficiency 03/28/2023    First degree atrioventricular block 09/13/2023    Hemangioma of skin and subcutaneous tissue 03/28/2023    Melanocytic nevi of trunk 03/28/2023    Neoplasm of uncertain behavior of skin 03/28/2023    Actinic keratosis 03/28/2023    Personal history of other malignant neoplasm of skin 03/28/2023    Dermatitis, unspecified 03/28/2023    Psoriasis 09/13/2023    Scar condition and fibrosis of skin 03/28/2023    Right bundle branch block 09/13/2023    Rosacea, unspecified 03/28/2023    Presbyopia of both eyes 10/25/2023    Myopia of both eyes 10/25/2023    Non-recurrent unilateral inguinal hernia without obstruction or gangrene 04/05/2024    Ataxia 04/16/2024    Gait instability 08/27/2024    Chronic eczematous otitis externa of both ears 02/19/2025     Resolved Ambulatory Problems     Diagnosis Date Noted    Left inguinal hernia 03/22/2024     Past Medical History:   Diagnosis Date    A-fib (Multi)     Alcohol abuse, uncomplicated     Anxiety disorder, unspecified     Dermatochalasis of unspecified eye, unspecified eyelid 01/06/2020    Dermatochalasis of unspecified eye, unspecified eyelid 01/06/2020    Dermatochalasis of unspecified eye, unspecified eyelid 01/06/2020    DM type 2 (diabetes mellitus, type 2) (Multi)     HTN (hypertension)     Male erectile dysfunction, unspecified     Other conditions influencing health status     Other specified diseases of liver     Palpitations     Personal history of other diseases of male genital organs     Personal history of other diseases of the circulatory system     Personal history of other endocrine, nutritional and metabolic disease     Pinguecula, right eye 01/06/2020    Pinguecula, right eye 01/06/2020    Pinguecula, right eye 01/06/2020    Pure hypercholesterolemia, unspecified 07/22/2022        Visit Vitals  /76   Pulse 62   Wt 81.6 kg (180 lb)   SpO2 98%   BMI 22.50 kg/m²   Smoking Status Former   BSA 2.08 m²        Objective  "    Constitutional:       Appearance: Healthy appearance.   Eyes:      Pupils: Pupils are equal, round, and reactive to light.   Neck:      Vascular: JVD normal.   Pulmonary:      Breath sounds: Normal breath sounds.   Cardiovascular:      PMI at left midclavicular line. Normal rate. Irregularly irregular rhythm. Normal S1. Normal S2.       Murmurs: There is a grade 1to 2/6 systolic murmur.      No gallop.  No click. No rub.   Pulses:     Intact distal pulses.   Edema:     Peripheral edema absent.   Abdominal:      Palpations: Abdomen is soft.      Tenderness: There is no abdominal tenderness.   Musculoskeletal:      Extremities: No clubbing present.Skin:     General: Skin is warm and dry.   Neurological:      General: No focal deficit present.            Lab Review:         Lab Results   Component Value Date    CHOL 120 08/27/2024    CHOL 94 08/15/2023    CHOL 111 07/21/2023     Lab Results   Component Value Date    HDL 37.7 08/27/2024    HDL 27.7 (A) 08/15/2023    HDL 31.5 (A) 07/21/2023     Lab Results   Component Value Date    LDLCALC 63 (L) 01/16/2018     Lab Results   Component Value Date    TRIG 97 08/15/2023    TRIG 155 (H) 07/21/2023    TRIG 93 02/22/2022     No components found for: \"CHOLHDL\"     Assessment/Plan     Atrial fibrillation (Multi)  Is doing well and the afib is controlled.  Is on the xarelto    Hypertension  Is dong well     "

## 2024-03-18 ENCOUNTER — OFFICE VISIT (OUTPATIENT)
Dept: CARDIOLOGY | Facility: CLINIC | Age: 79
End: 2024-03-18
Payer: MEDICARE

## 2024-03-18 VITALS
BODY MASS INDEX: 22.5 KG/M2 | DIASTOLIC BLOOD PRESSURE: 76 MMHG | WEIGHT: 180 LBS | SYSTOLIC BLOOD PRESSURE: 138 MMHG | OXYGEN SATURATION: 98 % | HEART RATE: 62 BPM

## 2024-03-18 DIAGNOSIS — I10 PRIMARY HYPERTENSION: ICD-10-CM

## 2024-03-18 DIAGNOSIS — I48.21 PERMANENT ATRIAL FIBRILLATION (MULTI): Primary | ICD-10-CM

## 2024-03-18 PROCEDURE — 1159F MED LIST DOCD IN RCRD: CPT | Performed by: INTERNAL MEDICINE

## 2024-03-18 PROCEDURE — 1036F TOBACCO NON-USER: CPT | Performed by: INTERNAL MEDICINE

## 2024-03-18 PROCEDURE — 99213 OFFICE O/P EST LOW 20 MIN: CPT | Performed by: INTERNAL MEDICINE

## 2024-03-18 PROCEDURE — 1125F AMNT PAIN NOTED PAIN PRSNT: CPT | Performed by: INTERNAL MEDICINE

## 2024-03-18 PROCEDURE — 3075F SYST BP GE 130 - 139MM HG: CPT | Performed by: INTERNAL MEDICINE

## 2024-03-18 PROCEDURE — 1157F ADVNC CARE PLAN IN RCRD: CPT | Performed by: INTERNAL MEDICINE

## 2024-03-18 PROCEDURE — 3078F DIAST BP <80 MM HG: CPT | Performed by: INTERNAL MEDICINE

## 2024-03-18 ASSESSMENT — PAIN SCALES - GENERAL: PAINLEVEL: 5

## 2024-03-22 ENCOUNTER — OFFICE VISIT (OUTPATIENT)
Dept: PRIMARY CARE | Facility: CLINIC | Age: 79
End: 2024-03-22
Payer: MEDICARE

## 2024-03-22 VITALS
WEIGHT: 180.4 LBS | DIASTOLIC BLOOD PRESSURE: 72 MMHG | BODY MASS INDEX: 22.55 KG/M2 | TEMPERATURE: 97.9 F | RESPIRATION RATE: 16 BRPM | HEART RATE: 73 BPM | SYSTOLIC BLOOD PRESSURE: 132 MMHG | OXYGEN SATURATION: 98 %

## 2024-03-22 DIAGNOSIS — L30.9 DERMATITIS, UNSPECIFIED: ICD-10-CM

## 2024-03-22 DIAGNOSIS — K40.90 LEFT INGUINAL HERNIA: Primary | ICD-10-CM

## 2024-03-22 PROCEDURE — 3078F DIAST BP <80 MM HG: CPT | Performed by: FAMILY MEDICINE

## 2024-03-22 PROCEDURE — 1159F MED LIST DOCD IN RCRD: CPT | Performed by: FAMILY MEDICINE

## 2024-03-22 PROCEDURE — 3075F SYST BP GE 130 - 139MM HG: CPT | Performed by: FAMILY MEDICINE

## 2024-03-22 PROCEDURE — 1157F ADVNC CARE PLAN IN RCRD: CPT | Performed by: FAMILY MEDICINE

## 2024-03-22 PROCEDURE — 1036F TOBACCO NON-USER: CPT | Performed by: FAMILY MEDICINE

## 2024-03-22 PROCEDURE — 99214 OFFICE O/P EST MOD 30 MIN: CPT | Performed by: FAMILY MEDICINE

## 2024-03-22 RX ORDER — HYDROCORTISONE 25 MG/G
OINTMENT TOPICAL 2 TIMES DAILY PRN
Qty: 454 G | Refills: 0 | Status: SHIPPED | OUTPATIENT
Start: 2024-03-22 | End: 2025-03-22

## 2024-03-22 RX ORDER — NYSTATIN AND TRIAMCINOLONE ACETONIDE 100000; 1 [USP'U]/G; MG/G
OINTMENT TOPICAL
Qty: 60 G | Refills: 1 | Status: SHIPPED | OUTPATIENT
Start: 2024-03-22

## 2024-03-22 NOTE — PROGRESS NOTES
"Subjective   Patient ID: Jhonny Baron is a 78 y.o. male who presents for Groin Pain (Since the mid feb.).    Started after sitting on hard/uncomfortable folding chair for hours and helping another man move a table.   Progerssive pain -- worse when rising after sitting for any extended period of time.  Getting out of car.     No pain when lying flat.      Objective   Visit Vitals  /72 (BP Location: Left arm, Patient Position: Sitting, BP Cuff Size: Child)   Pulse 73   Temp 36.6 °C (97.9 °F) (Temporal)   Resp 16   Wt 81.8 kg (180 lb 6.4 oz)   SpO2 98%   BMI 22.55 kg/m²   Smoking Status Former   BSA 2.08 m²      O: VSS AFEB Awake, Alert, NAD.  No intoxication, withdrawal, or sedation.  Chest CTA.  Heart RRR 2/6 JUSTIN.  Ext no c/c/e.    Dermatitis in groin and face.     Left inguinal hernia.  Reducible but reproduces his pain.     Lab Results   Component Value Date    WBC 7.8 07/21/2023    HGB 13.4 (L) 07/21/2023    HCT 43.4 07/21/2023     (H) 07/21/2023     (H) 07/21/2023       Chemistry    Lab Results   Component Value Date/Time     11/22/2023 0949    K 4.3 11/22/2023 0949     11/22/2023 0949    CO2 27 11/22/2023 0949    BUN 29 (H) 11/22/2023 0949    CREATININE 1.08 11/22/2023 0949    Lab Results   Component Value Date/Time    CALCIUM 9.8 11/22/2023 0949    ALKPHOS 73 11/22/2023 0949    AST 23 11/22/2023 0949    ALT 16 11/22/2023 0949    BILITOT 0.9 11/22/2023 0949          Lab Results   Component Value Date    CHOL 94 08/15/2023    CHOL 111 07/21/2023    CHOL 101 07/22/2022     Lab Results   Component Value Date    HDL 27.7 (A) 08/15/2023    HDL 31.5 (A) 07/21/2023    HDL 30.3 (A) 07/22/2022     Lab Results   Component Value Date    LDLCALC 63 (L) 01/16/2018     Lab Results   Component Value Date    TRIG 97 08/15/2023    TRIG 155 (H) 07/21/2023    TRIG 93 02/22/2022     No components found for: \"CHOLHDL\"   Lab Results   Component Value Date    HGBA1C 5.9 (H) 11/22/2023 "       Assessment/Plan   Problem List Items Addressed This Visit       Dermatitis, unspecified    Overview     Tinea cruris on chronic eczema         Current Assessment & Plan      -- treat with mycolog ointment prior to surgery.          Relevant Medications    nystatin-triamcinolone (Mycolog II) ointment    Left inguinal hernia - Primary    Overview     Onset 2/2024.         Current Assessment & Plan     Reviewed instructions to go to ER in case of incarceration.   Refer to Dr Ahumada at [facility].         Relevant Orders    Referral to General Surgery

## 2024-03-22 NOTE — Clinical Note
Heather -- patient with new Left Inguinal hernia.  Reducible.  Patient lives in [place].  Hopes to get in to see you at [facility]. Royce THAPA

## 2024-03-22 NOTE — ASSESSMENT & PLAN NOTE
Reviewed instructions to go to ER in case of incarceration.   Refer to Dr Ahumada at [facility].

## 2024-03-26 ENCOUNTER — APPOINTMENT (OUTPATIENT)
Dept: DERMATOLOGY | Facility: CLINIC | Age: 79
End: 2024-03-26
Payer: MEDICARE

## 2024-04-02 ENCOUNTER — OFFICE VISIT (OUTPATIENT)
Dept: DERMATOLOGY | Facility: CLINIC | Age: 79
End: 2024-04-02
Payer: MEDICARE

## 2024-04-02 DIAGNOSIS — Z85.828 HISTORY OF NONMELANOMA SKIN CANCER: ICD-10-CM

## 2024-04-02 DIAGNOSIS — L82.1 SEBORRHEIC KERATOSIS: ICD-10-CM

## 2024-04-02 DIAGNOSIS — D48.5 NEOPLASM OF UNCERTAIN BEHAVIOR OF SKIN: Primary | ICD-10-CM

## 2024-04-02 DIAGNOSIS — L81.4 LENTIGO: ICD-10-CM

## 2024-04-02 DIAGNOSIS — D69.2 SENILE PURPURA (CMS-HCC): ICD-10-CM

## 2024-04-02 DIAGNOSIS — L90.5 SCAR CONDITIONS AND FIBROSIS OF SKIN: ICD-10-CM

## 2024-04-02 DIAGNOSIS — L57.9 SKIN CHANGES DUE TO CHRONIC EXPOSURE TO NONIONIZING RADIATION: ICD-10-CM

## 2024-04-02 DIAGNOSIS — D18.01 ANGIOMA OF SKIN: ICD-10-CM

## 2024-04-02 DIAGNOSIS — L30.9 DERMATITIS: ICD-10-CM

## 2024-04-02 DIAGNOSIS — D22.9 BENIGN NEVUS: ICD-10-CM

## 2024-04-02 PROCEDURE — 1157F ADVNC CARE PLAN IN RCRD: CPT | Performed by: NURSE PRACTITIONER

## 2024-04-02 PROCEDURE — 88305 TISSUE EXAM BY PATHOLOGIST: CPT | Performed by: DERMATOLOGY

## 2024-04-02 PROCEDURE — 1159F MED LIST DOCD IN RCRD: CPT | Performed by: NURSE PRACTITIONER

## 2024-04-02 PROCEDURE — 11103 TANGNTL BX SKIN EA SEP/ADDL: CPT | Performed by: NURSE PRACTITIONER

## 2024-04-02 PROCEDURE — 11102 TANGNTL BX SKIN SINGLE LES: CPT | Performed by: NURSE PRACTITIONER

## 2024-04-02 PROCEDURE — 1160F RVW MEDS BY RX/DR IN RCRD: CPT | Performed by: NURSE PRACTITIONER

## 2024-04-02 PROCEDURE — 1036F TOBACCO NON-USER: CPT | Performed by: NURSE PRACTITIONER

## 2024-04-02 PROCEDURE — 99213 OFFICE O/P EST LOW 20 MIN: CPT | Performed by: NURSE PRACTITIONER

## 2024-04-02 NOTE — PATIENT INSTRUCTIONS

## 2024-04-02 NOTE — PROGRESS NOTES
Subjective     Jhonny Baron is a 78 y.o. male who presents for the following: Skin Check.     Review of Systems:  No other skin or systemic complaints other than what is documented elsewhere in the note.    The following portions of the chart were reviewed this encounter and updated as appropriate:   Tobacco  Allergies  Meds  Problems  Med Hx  Surg Hx         Skin Cancer History  No skin cancer on file.      Specialty Problems          Dermatology Problems    Actinic keratosis    Atopic dermatitis, unspecified    Dermatitis, unspecified     Tinea cruris on chronic eczema         Hemangioma of skin and subcutaneous tissue    Melanocytic nevi of trunk    Neoplasm of uncertain behavior of skin    Personal history of other malignant neoplasm of skin    Rosacea, unspecified    Scar condition and fibrosis of skin    Contact dermatitis and eczema     Contact dermatitis vs Xerotic eczema -- treated with oral CS 12/2020.   Per path stable with regular use of aveeno.   Using triamcinolone cream/ointment for acute flare-ups.   Continue using DOVE soap -- nothing stronger.         Skin cancer of face    Psoriasis        Objective   Well appearing patient in no apparent distress; mood and affect are within normal limits.    A focused skin examination was performed neck up and arms. All findings within normal limits unless otherwise noted below.    Assessment/Plan   1. Neoplasm of uncertain behavior of skin (2)  Mid Parietal Scalp  5 mm erythematous crusty papule          Lesion biopsy  Type of biopsy: tangential    Informed consent: discussed and consent obtained    Timeout: patient name, date of birth, surgical site, and procedure verified    Procedure prep:  Patient was prepped and draped  Anesthesia: the lesion was anesthetized in a standard fashion    Anesthetic:  1% lidocaine plain local infiltration  Instrument used: DermaBlade    Hemostasis achieved with: electrodesiccation    Outcome: patient tolerated procedure  well    Post-procedure details: wound care instructions given    Additional details:  Cleaned area with isopropyl alcohol prior to anesthesia or biopsy. Applied thin layer of vaseline and covered with bandaid after procedure      Staff Communication: Dermatology Local Anesthesia: 1 % Plain Lidocaine - Amount:0.5 ml per lesion    Specimen 1 - Dermatopathology- DERM LAB  Differential Diagnosis: NMSC vs HAK  Check Margins Yes/No?:    Comments:    Dermpath Lab: Routine Histopathology (formalin-fixed tissue)    Mid Parietal Scalp  15 x 9 mm erythematous crusty plaque          Lesion biopsy  Type of biopsy: tangential    Informed consent: discussed and consent obtained    Timeout: patient name, date of birth, surgical site, and procedure verified    Procedure prep:  Patient was prepped and draped  Anesthesia: the lesion was anesthetized in a standard fashion    Anesthetic:  1% lidocaine plain local infiltration  Instrument used: DermaBlade    Hemostasis achieved with: electrodesiccation    Outcome: patient tolerated procedure well    Post-procedure details: wound care instructions given    Additional details:  Cleaned area with isopropyl alcohol prior to anesthesia or biopsy. Applied thin layer of vaseline and covered with bandaid after procedure      Staff Communication: Dermatology Local Anesthesia: 1 % Plain Lidocaine - Amount:0.5 ml per lesion    Specimen 2 - Dermatopathology- DERM LAB  Differential Diagnosis: NMSC vs Pityriasis amiantacea vs Dermatitis vs Psoriasis  Check Margins Yes/No?:    Comments:    Dermpath Lab: Routine Histopathology (formalin-fixed tissue)    NUB  - Given uncertainty in clinical diagnosis, shave biopsy is recommended in clinic today.  - The patient expressed understanding, is in agreement with this plan, and wishes to proceed with biopsy.  - Oral and written wound care instructions provided.  - Advised patient that the office will call within 2 weeks to discuss biopsy results.      Related  Procedures  Follow Up In Dermatology - Established Patient    2. Angioma of skin  Scattered cherry-red papule(s).    A cherry hemangioma is a small macule (small, flat, smooth area) or papule (small, solid bump) formed from an overgrowth of tiny blood vessels in the skin. Cherry hemangiomas are characteristically red or purplish in color. They often first appear in middle adulthood and usually increase in number with age. Cherry hemangiomas are noncancerous (benign) and are common in adults.    The present appearance of the lesion is not worrisome but it should continue to be observed and testing/treatment may be warranted if change occurs.    Related Procedures  Follow Up In Dermatology - Established Patient    3. Benign nevus  Scattered, uniform and benign-appearing, regular brown melanocytic papules and macules.    The present appearance of the lesion is not worrisome but it should continue to be observed and testing/treatment may be warranted if change occurs.    Related Procedures  Follow Up In Dermatology - Established Patient    4. Seborrheic keratosis  Stuck on verrucous, tan-brown papules and plaques.      Seborrheic keratoses are common noncancerous (benign) growths of unknown cause seen in adults due to a thickening of an area of the top skin layer. Seborrheic keratoses may appear as if they are stuck on to the skin. They have distinct borders, and they may appear as papules (small, solid bumps) or plaques (solid, raised patches that are bigger than a thumbnail). They may be the same color as your skin, or they may be pink, light brown, darker brown, or very dark brown, or sometimes may appear black.    There is no way to prevent new seborrheic keratoses from forming. Seborrheic keratoses can be removed, but removal is considered a cosmetic issue and is usually not covered by insurance.    PLAN  No treatment is needed unless there is irritation from clothing, such as itching or bleeding.  2.   Some  lotions containing alpha hydroxy acids, salicylic acid, or urea may make the areas feel smoother with regular use but will not eliminate them.    Related Procedures  Follow Up In Dermatology - Established Patient    5. Lentigo  Scattered tan macules in sun-exposed areas.    A solar lentigo (plural, solar lentigines), also known as a sun-induced freckle or senile lentigo, is a dark (hyperpigmented) lesion caused by natural or artificial ultraviolet (UV) light. Solar lentigines may be single or multiple. This type of lentigo is different from a simple lentigo (lentigo simplex) because it is caused by exposure to UV light. Solar lentigines are benign, but they do indicate excessive sun exposure, a risk factor for the development of skin cancer.    To prevent solar lentigines, avoid exposure to sunlight in midday (10 AM to 3 PM), wear sun-protective clothing (tightly woven clothes and hats), and apply sunscreen (SPF 30 UVA and UVB block).    The present appearance of the lesion is not worrisome but it should continue to be observed and testing/treatment may be warranted if change occurs.    Related Procedures  Follow Up In Dermatology - Established Patient    6. Scar conditions and fibrosis of skin  Well healed scar at the site(s) of prior treatment with no evidence of recurrence.          The scar is clear, there is no evidence of recurrence.  The present appearance of the scar is not worrisome but it should continue to be observed and testing/treatment may be warranted if change occurs.      Related Procedures  Follow Up In Dermatology - Established Patient    7. History of nonmelanoma skin cancer    ABCDEs of melanoma and atypical moles were discussed with the patient.    Patient was instructed to perform monthly self skin examination.  We recommended that the patient have regular full skin exams given an increased risk of subsequent skin cancers.    The patient was instructed to use sun protective behaviors  including use of broad spectrum sunscreens and sun protective clothing to reduce risk of skin cancers.    Warning signs of non-melanoma skin cancer discussed.    Related Procedures  Follow Up In Dermatology - Established Patient    8. Skin changes due to chronic exposure to nonionizing radiation  Actinic changes in the form of freckles, lentigines and hyper/hypopigmentation     ABCDEs of melanoma and atypical moles were discussed with the patient.    Patient was instructed to perform monthly self skin examination.  We recommended that the patient have regular full skin exams given an increased risk of subsequent skin cancers.    The patient was instructed to use sun protective behaviors including use of broad spectrum sunscreens and sun protective clothing to reduce risk of skin cancers.    Warning signs of non-melanoma skin cancer discussed.    Related Procedures  Follow Up In Dermatology - Established Patient    9. Dermatitis  Head - Anterior (Face), Left Popliteal Fossa, Right Popliteal Fossa  Erythematous semi scaly macules and patches. For face, mostly to the beard area.      Patient reports now having itchy red areas to the popliteal fossa. Patient has ongoing dermatitis to the face, likely 2/2 to shaving but overall improved compared to previous exam with significant reduction in surface area involved and more pink then red. Continue with avoiding using aftershave. Discussed gentle cleansing practices and may continue to use HTC cream BID PRN. Advised ok to use TAC on the popliteal area as directed by Dr. Arango and continue with PRN use (currently using 1-2 times a week at most.     Related Procedures  Follow Up In Dermatology - Established Patient    10. Senile purpura (CMS/HCC) (2)  Left Forearm - Posterior, Right Forearm - Posterior  Maroon to purple macules and patches on the forearms/shins    Solar purpura refers to red blood cells leaking into previously sun-damaged skin. It is usually on the arms  and the backs of the hands in older adults. The skin appears to be bruised with dark red to purple coloration. The bruised appearance may not heal for 2 or 3 weeks.    Solar purpura is a harmless, benign condition. Further exposure to the sun should be prevented by using sunscreen and covering exposed skin.    Minimize trauma to the skin whenever possible; consider a double layer of clothing to protect the forearms and anterior shins. Men's long athletic socks with the feet cut off at the ankle can provide a protective layer for the arms and legs when around the house.    Related Procedures  Follow Up In Dermatology - Established Patient        Return in 6 months for routine skin check or return to clinic sooner if needed

## 2024-04-04 LAB
LABORATORY COMMENT REPORT: NORMAL
PATH REPORT.FINAL DX SPEC: NORMAL
PATH REPORT.GROSS SPEC: NORMAL
PATH REPORT.MICROSCOPIC SPEC OTHER STN: NORMAL
PATH REPORT.RELEVANT HX SPEC: NORMAL
PATH REPORT.TOTAL CANCER: NORMAL

## 2024-04-05 ENCOUNTER — TELEPHONE (OUTPATIENT)
Dept: DERMATOLOGY | Facility: CLINIC | Age: 79
End: 2024-04-05
Payer: MEDICARE

## 2024-04-05 ENCOUNTER — HOSPITAL ENCOUNTER (OUTPATIENT)
Facility: HOSPITAL | Age: 79
Setting detail: OUTPATIENT SURGERY
End: 2024-04-05
Attending: SURGERY | Admitting: SURGERY
Payer: MEDICARE

## 2024-04-05 DIAGNOSIS — K40.90 NON-RECURRENT UNILATERAL INGUINAL HERNIA WITHOUT OBSTRUCTION OR GANGRENE: ICD-10-CM

## 2024-04-05 DIAGNOSIS — L08.9 INFECTION OF SKIN AND SUBCUTANEOUS TISSUE: Primary | ICD-10-CM

## 2024-04-05 RX ORDER — DOXYCYCLINE 100 MG/1
CAPSULE ORAL
Qty: 14 CAPSULE | Refills: 0 | Status: SHIPPED | OUTPATIENT
Start: 2024-04-05

## 2024-04-05 NOTE — TELEPHONE ENCOUNTER
Pt called in regards to his prescription for Doxycycline Monohydrate. Would like to discuss medication more before he starts taking it. Give Mr. Baron a call back thank you.  Information has been provided to Patient.

## 2024-04-08 NOTE — PROGRESS NOTES
Subjective   Patient ID: Jhonny Baron is a 78 y.o. male who presents for No chief complaint on file..  HPI    Review of Systems    Objective   Physical Exam    Assessment/Plan   {Assess/PlanSmartLinks:51935}         Heather Ahumada MD 04/08/24 12:33 PM

## 2024-04-09 ENCOUNTER — APPOINTMENT (OUTPATIENT)
Dept: SURGERY | Facility: CLINIC | Age: 79
End: 2024-04-09
Payer: MEDICARE

## 2024-04-15 ENCOUNTER — TELEPHONE (OUTPATIENT)
Dept: PRIMARY CARE | Facility: CLINIC | Age: 79
End: 2024-04-15
Payer: MEDICARE

## 2024-04-15 NOTE — TELEPHONE ENCOUNTER
Pt's wife called in right after pt called requesting a call from someone to talk about the concerns she has with the pt. Her biggest concern is him being confused.

## 2024-04-15 NOTE — TELEPHONE ENCOUNTER
Pt called stating he left a vm today for melanie, I informed the pt the days that he is in the office. I offered to leave a message pt told me to just leave a message that he called and he will call again tomorrow.

## 2024-04-16 ENCOUNTER — OFFICE VISIT (OUTPATIENT)
Dept: PRIMARY CARE | Facility: CLINIC | Age: 79
End: 2024-04-16
Payer: MEDICARE

## 2024-04-16 ENCOUNTER — LAB (OUTPATIENT)
Dept: LAB | Facility: LAB | Age: 79
End: 2024-04-16
Payer: MEDICARE

## 2024-04-16 VITALS — SYSTOLIC BLOOD PRESSURE: 102 MMHG | DIASTOLIC BLOOD PRESSURE: 56 MMHG

## 2024-04-16 DIAGNOSIS — K52.9 GASTROENTERITIS: ICD-10-CM

## 2024-04-16 DIAGNOSIS — R27.0 ATAXIA: ICD-10-CM

## 2024-04-16 DIAGNOSIS — I10 PRIMARY HYPERTENSION: Primary | ICD-10-CM

## 2024-04-16 DIAGNOSIS — I10 PRIMARY HYPERTENSION: ICD-10-CM

## 2024-04-16 DIAGNOSIS — I95.9 HYPOTENSION, UNSPECIFIED HYPOTENSION TYPE: ICD-10-CM

## 2024-04-16 LAB
ALBUMIN SERPL BCP-MCNC: 4.1 G/DL (ref 3.4–5)
ALP SERPL-CCNC: 73 U/L (ref 33–136)
ALT SERPL W P-5'-P-CCNC: 19 U/L (ref 10–52)
ANION GAP SERPL CALC-SCNC: 17 MMOL/L (ref 10–20)
AST SERPL W P-5'-P-CCNC: 23 U/L (ref 9–39)
BILIRUB SERPL-MCNC: 1.7 MG/DL (ref 0–1.2)
BUN SERPL-MCNC: 25 MG/DL (ref 6–23)
CALCIUM SERPL-MCNC: 8.9 MG/DL (ref 8.6–10.3)
CHLORIDE SERPL-SCNC: 103 MMOL/L (ref 98–107)
CO2 SERPL-SCNC: 24 MMOL/L (ref 21–32)
CREAT SERPL-MCNC: 1.36 MG/DL (ref 0.5–1.3)
EGFRCR SERPLBLD CKD-EPI 2021: 53 ML/MIN/1.73M*2
ERYTHROCYTE [DISTWIDTH] IN BLOOD BY AUTOMATED COUNT: 14.3 % (ref 11.5–14.5)
GLUCOSE SERPL-MCNC: 149 MG/DL (ref 74–99)
HCT VFR BLD AUTO: 40.3 % (ref 41–52)
HGB BLD-MCNC: 13.1 G/DL (ref 13.5–17.5)
LACTATE SERPL-SCNC: 1.9 MMOL/L (ref 0.4–2)
MCH RBC QN AUTO: 29.7 PG (ref 26–34)
MCHC RBC AUTO-ENTMCNC: 32.5 G/DL (ref 32–36)
MCV RBC AUTO: 91 FL (ref 80–100)
NRBC BLD-RTO: 0 /100 WBCS (ref 0–0)
PLATELET # BLD AUTO: 704 X10*3/UL (ref 150–450)
POTASSIUM SERPL-SCNC: 4.3 MMOL/L (ref 3.5–5.3)
PROT SERPL-MCNC: 6.5 G/DL (ref 6.4–8.2)
RBC # BLD AUTO: 4.41 X10*6/UL (ref 4.5–5.9)
SODIUM SERPL-SCNC: 140 MMOL/L (ref 136–145)
WBC # BLD AUTO: 14.5 X10*3/UL (ref 4.4–11.3)

## 2024-04-16 PROCEDURE — 85027 COMPLETE CBC AUTOMATED: CPT

## 2024-04-16 PROCEDURE — 1160F RVW MEDS BY RX/DR IN RCRD: CPT | Performed by: FAMILY MEDICINE

## 2024-04-16 PROCEDURE — 80053 COMPREHEN METABOLIC PANEL: CPT

## 2024-04-16 PROCEDURE — 36415 COLL VENOUS BLD VENIPUNCTURE: CPT

## 2024-04-16 PROCEDURE — 83605 ASSAY OF LACTIC ACID: CPT

## 2024-04-16 PROCEDURE — 86709 HEPATITIS A IGM ANTIBODY: CPT

## 2024-04-16 PROCEDURE — 1157F ADVNC CARE PLAN IN RCRD: CPT | Performed by: FAMILY MEDICINE

## 2024-04-16 PROCEDURE — 1159F MED LIST DOCD IN RCRD: CPT | Performed by: FAMILY MEDICINE

## 2024-04-16 PROCEDURE — 3078F DIAST BP <80 MM HG: CPT | Performed by: FAMILY MEDICINE

## 2024-04-16 PROCEDURE — 99214 OFFICE O/P EST MOD 30 MIN: CPT | Performed by: FAMILY MEDICINE

## 2024-04-16 PROCEDURE — 3074F SYST BP LT 130 MM HG: CPT | Performed by: FAMILY MEDICINE

## 2024-04-16 PROCEDURE — 1036F TOBACCO NON-USER: CPT | Performed by: FAMILY MEDICINE

## 2024-04-16 NOTE — ASSESSMENT & PLAN NOTE
Persistent LE Paresthesias -- no loss of monofilament sensation. suspect intermittent nerve compression. As long as numbness does not persist will hold off on EMG testing.

## 2024-04-16 NOTE — PROGRESS NOTES
"Subjective   Patient ID: Jhonny Baron is a 78 y.o. male who presents for C/O CONFUSION following acute GE thought from food poisoning.  N/v/d/.  No bleeding.  No Fever.  Off and on confusion and unsteady gait.  Disoriented to what was going on --not forgetful per se.      Diarrhea resolved.  Then the confusion started.       Objective   Visit Vitals  /56   Smoking Status Former      O: VSS AFEB Awake, Alert, NAD.  Mildly ataxic.  No bizarre activity.  Mildly confused.  No intoxication, withdrawal, or sedation.  Chest CTA.  Heart RRR.  Ext no c/c/e.    No focal neuro deficits.   No cerebellar dysfunction.     Lab Results   Component Value Date    WBC 7.8 07/21/2023    HGB 13.4 (L) 07/21/2023    HCT 43.4 07/21/2023     (H) 07/21/2023     (H) 07/21/2023       Chemistry    Lab Results   Component Value Date/Time     11/22/2023 0949    K 4.3 11/22/2023 0949     11/22/2023 0949    CO2 27 11/22/2023 0949    BUN 29 (H) 11/22/2023 0949    CREATININE 1.08 11/22/2023 0949    Lab Results   Component Value Date/Time    CALCIUM 9.8 11/22/2023 0949    ALKPHOS 73 11/22/2023 0949    AST 23 11/22/2023 0949    ALT 16 11/22/2023 0949    BILITOT 0.9 11/22/2023 0949          Lab Results   Component Value Date    CHOL 94 08/15/2023    CHOL 111 07/21/2023    CHOL 101 07/22/2022     Lab Results   Component Value Date    HDL 27.7 (A) 08/15/2023    HDL 31.5 (A) 07/21/2023    HDL 30.3 (A) 07/22/2022     Lab Results   Component Value Date    LDLCALC 63 (L) 01/16/2018     Lab Results   Component Value Date    TRIG 97 08/15/2023    TRIG 155 (H) 07/21/2023    TRIG 93 02/22/2022     No components found for: \"CHOLHDL\"   Lab Results   Component Value Date    HGBA1C 5.9 (H) 11/22/2023       Assessment/Plan   Problem List Items Addressed This Visit       Hypertension - Primary    Current Assessment & Plan     Low BP -- stop CCB plendil.  Continue current  ACEi, BB.         Relevant Orders    Comprehensive Metabolic " Panel    CBC    Ataxia    Overview     4/16/24 Ataxia and mild confusion/disorientation following viral AGE.          Current Assessment & Plan     Check labs.  Push high sodium soups, cracker, drinking water and gatorade.

## 2024-04-17 DIAGNOSIS — K52.9 GASTROENTERITIS: Primary | ICD-10-CM

## 2024-04-17 LAB — HAV IGM SER QL: NONREACTIVE

## 2024-04-19 NOTE — PROGRESS NOTES
Subjective   Patient ID: Jhonny Baron is a 78 y.o. male who presents for hernia evaluation.  HPI  Here for evaluation of suspected left inguinal hernia. No imaging patient indicates about 1-1/2 months ago he was sitting in a meeting and then noticed the discomfort.  He has had bulging intermittently.  He has some twinges on the left.  No symptoms on the right.  He does believe he has had a hernia repair in the past.  Unsure where.  Patient's weight has been essentially stable.  He has had prostate surgery with a low midline incision.    Review of Systems   Constitutional: Negative.  Negative for appetite change, fever and unexpected weight change.   HENT: Negative.  Negative for congestion and trouble swallowing.    Eyes: Negative.    Respiratory: Negative.  Negative for cough and shortness of breath.    Cardiovascular:  Negative for chest pain and palpitations.   Gastrointestinal:  Negative for abdominal pain, diarrhea and nausea.   Endocrine: Negative.    Genitourinary: Negative.  Negative for difficulty urinating and dysuria.   Musculoskeletal:  Negative for back pain and gait problem.   Skin:  Negative for color change and rash.   Allergic/Immunologic: Negative.    Neurological: Negative.  Negative for dizziness, speech difficulty and headaches.   Hematological:  Does not bruise/bleed easily.   Psychiatric/Behavioral:  Negative for confusion.      Past Surgical History:   Procedure Laterality Date    COLONOSCOPY N/A 2018    [facility]    COLONOSCOPY N/A 2006    Complete Colonoscopy    HERNIA REPAIR      Hernia Repair    OTHER SURGICAL HISTORY  11/27/2012    Renal Endoscopy Through Nephrostomy With Calculus Removal    PROSTATE SURGERY  11/27/2012    Prostate Surgery    TONSILLECTOMY  11/27/2012    Tonsillectomy     Social History:  Tobacco Use - former  Do you use any recreational drugs - no  Alcohol Use - occasional beer  Caffeine Intake - daily- coffee, tea, and soda  Working - retired  Marital status -  "  Living with - wife  No Known Allergies  Family History   Problem Relation Name Age of Onset    Hypertension Mother      Hypertension Father       Past Medical History:   Diagnosis Date    Alcohol abuse, uncomplicated     Alcohol abuse, episodic    Anxiety disorder, unspecified     Anxiety    Dermatochalasis of unspecified eye, unspecified eyelid 01/06/2020    Dermatochalasis    Dermatochalasis of unspecified eye, unspecified eyelid 01/06/2020    Dermatochalasis    Dermatochalasis of unspecified eye, unspecified eyelid 01/06/2020    Dermatochalasis    Male erectile dysfunction, unspecified     Inability to attain erection    Other conditions influencing health status     Prostate Cancer    Other specified diseases of liver     Hepatic cyst    Palpitations     Palpitations    Personal history of other diseases of male genital organs     History of benign prostatic hypertrophy    Personal history of other diseases of the circulatory system     History of hypertension    Personal history of other endocrine, nutritional and metabolic disease     History of hypercholesterolemia    Pinguecula, right eye 01/06/2020    Pinguecula of right eye    Pinguecula, right eye 01/06/2020    Pinguecula of right eye    Pinguecula, right eye 01/06/2020    Pinguecula of right eye    Pure hypercholesterolemia, unspecified 07/22/2022    High cholesterol          Objective   Physical Exam  /78 (BP Location: Right arm, Patient Position: Sitting)   Pulse 58   Ht 1.905 m (6' 3\")   Wt 83.7 kg (184 lb 9.6 oz)   SpO2 98%   BMI 23.07 kg/m²    GENERAL APPEARANCE: Patient appears in no acute distress.   EYES: Sclera non-icteric, PERRLA.   ENT Normal appearance of ears and nose.   NECK/THYROID: Neck: no masses. Thyroid: no masses.   LYMPH NODES: No cervical or supraclavicular lymphadenopathy.   CARDIOVASCULAR Heart: RRR, patient with a positive heart murmur on exam carotid bruits: none; Peripheral edema: none.   RESPIRATORY: Lungs: " clear to auscultation bilaterally; no respiratory distress.   GI (ABDOMEN) No intraabdominal mass appreciated, no hepatosplenomegaly; Hernia: Patient with an obvious left inguinal hernia with Valsalva.  It is reducible.  Patient on the right side with no evidence of hernia.  I do not see any scars to indicate previous inguinal hernia repair patient does have a low midline scar from previous prostate surgery without any evidence of hernia here.; Tenderness: none.   PSYCH: Patient oriented to time, place and person, normal affect.    Assessment/Plan   Problem List Items Addressed This Visit             ICD-10-CM    Left inguinal hernia - Primary K40.90     Patient with an obvious left inguinal hernia.  Patient with a low midline scar secondary to prostate surgery making laparoscopic repair trans abdominal extraperitoneal not viable option.  Recommend open left inguinal hernia repair with mesh.  Risk benefits alternatives of doing the surgery were thoroughly discussed with the patient agrees to proceed                 Heather Ahumada MD 04/25/24 1:01 PM

## 2024-04-23 ENCOUNTER — LAB (OUTPATIENT)
Dept: LAB | Facility: LAB | Age: 79
End: 2024-04-23
Payer: MEDICARE

## 2024-04-23 DIAGNOSIS — K52.9 GASTROENTERITIS: ICD-10-CM

## 2024-04-23 LAB
ALBUMIN SERPL BCP-MCNC: 4 G/DL (ref 3.4–5)
ALP SERPL-CCNC: 74 U/L (ref 33–136)
ALT SERPL W P-5'-P-CCNC: 14 U/L (ref 10–52)
ANION GAP SERPL CALC-SCNC: 14 MMOL/L (ref 10–20)
AST SERPL W P-5'-P-CCNC: 19 U/L (ref 9–39)
BILIRUB SERPL-MCNC: 1 MG/DL (ref 0–1.2)
BUN SERPL-MCNC: 23 MG/DL (ref 6–23)
CALCIUM SERPL-MCNC: 9.1 MG/DL (ref 8.6–10.6)
CHLORIDE SERPL-SCNC: 104 MMOL/L (ref 98–107)
CO2 SERPL-SCNC: 28 MMOL/L (ref 21–32)
CREAT SERPL-MCNC: 1.16 MG/DL (ref 0.5–1.3)
EGFRCR SERPLBLD CKD-EPI 2021: 64 ML/MIN/1.73M*2
ERYTHROCYTE [DISTWIDTH] IN BLOOD BY AUTOMATED COUNT: 14.7 % (ref 11.5–14.5)
GLUCOSE SERPL-MCNC: 137 MG/DL (ref 74–99)
HCT VFR BLD AUTO: 39.6 % (ref 41–52)
HGB BLD-MCNC: 12.5 G/DL (ref 13.5–17.5)
MCH RBC QN AUTO: 29.7 PG (ref 26–34)
MCHC RBC AUTO-ENTMCNC: 31.6 G/DL (ref 32–36)
MCV RBC AUTO: 94 FL (ref 80–100)
NRBC BLD-RTO: 0 /100 WBCS (ref 0–0)
PLATELET # BLD AUTO: 685 X10*3/UL (ref 150–450)
POTASSIUM SERPL-SCNC: 4.6 MMOL/L (ref 3.5–5.3)
PROT SERPL-MCNC: 6.7 G/DL (ref 6.4–8.2)
RBC # BLD AUTO: 4.21 X10*6/UL (ref 4.5–5.9)
SODIUM SERPL-SCNC: 141 MMOL/L (ref 136–145)
WBC # BLD AUTO: 9.9 X10*3/UL (ref 4.4–11.3)

## 2024-04-23 PROCEDURE — 85027 COMPLETE CBC AUTOMATED: CPT

## 2024-04-23 PROCEDURE — 36415 COLL VENOUS BLD VENIPUNCTURE: CPT

## 2024-04-23 PROCEDURE — 80053 COMPREHEN METABOLIC PANEL: CPT

## 2024-04-25 ENCOUNTER — OFFICE VISIT (OUTPATIENT)
Dept: SURGERY | Facility: CLINIC | Age: 79
End: 2024-04-25
Payer: MEDICARE

## 2024-04-25 ENCOUNTER — APPOINTMENT (OUTPATIENT)
Dept: SURGERY | Facility: CLINIC | Age: 79
End: 2024-04-25
Payer: MEDICARE

## 2024-04-25 VITALS
OXYGEN SATURATION: 98 % | HEART RATE: 58 BPM | HEIGHT: 75 IN | WEIGHT: 184.6 LBS | SYSTOLIC BLOOD PRESSURE: 139 MMHG | DIASTOLIC BLOOD PRESSURE: 78 MMHG | BODY MASS INDEX: 22.95 KG/M2

## 2024-04-25 DIAGNOSIS — K40.90 LEFT INGUINAL HERNIA: Primary | ICD-10-CM

## 2024-04-25 PROCEDURE — 3075F SYST BP GE 130 - 139MM HG: CPT | Performed by: SURGERY

## 2024-04-25 PROCEDURE — 1157F ADVNC CARE PLAN IN RCRD: CPT | Performed by: SURGERY

## 2024-04-25 PROCEDURE — 1160F RVW MEDS BY RX/DR IN RCRD: CPT | Performed by: SURGERY

## 2024-04-25 PROCEDURE — 1159F MED LIST DOCD IN RCRD: CPT | Performed by: SURGERY

## 2024-04-25 PROCEDURE — 1126F AMNT PAIN NOTED NONE PRSNT: CPT | Performed by: SURGERY

## 2024-04-25 PROCEDURE — 99214 OFFICE O/P EST MOD 30 MIN: CPT | Performed by: SURGERY

## 2024-04-25 PROCEDURE — 99204 OFFICE O/P NEW MOD 45 MIN: CPT | Performed by: SURGERY

## 2024-04-25 PROCEDURE — 3078F DIAST BP <80 MM HG: CPT | Performed by: SURGERY

## 2024-04-25 PROCEDURE — 1036F TOBACCO NON-USER: CPT | Performed by: SURGERY

## 2024-04-25 RX ORDER — CEFAZOLIN SODIUM 2 G/100ML
2 INJECTION, SOLUTION INTRAVENOUS ONCE
Status: CANCELLED | OUTPATIENT
Start: 2024-05-15 | End: 2024-04-25

## 2024-04-25 RX ORDER — SODIUM CHLORIDE, SODIUM LACTATE, POTASSIUM CHLORIDE, CALCIUM CHLORIDE 600; 310; 30; 20 MG/100ML; MG/100ML; MG/100ML; MG/100ML
100 INJECTION, SOLUTION INTRAVENOUS CONTINUOUS
Status: CANCELLED | OUTPATIENT
Start: 2024-05-15

## 2024-04-25 ASSESSMENT — ENCOUNTER SYMPTOMS
BRUISES/BLEEDS EASILY: 0
BACK PAIN: 0
ENDOCRINE NEGATIVE: 1
CONFUSION: 0
EYES NEGATIVE: 1
DIZZINESS: 0
SPEECH DIFFICULTY: 0
ALLERGIC/IMMUNOLOGIC NEGATIVE: 1
APPETITE CHANGE: 0
SHORTNESS OF BREATH: 0
FEVER: 0
DYSURIA: 0
CONSTITUTIONAL NEGATIVE: 1
NAUSEA: 0
HEADACHES: 0
DIARRHEA: 0
DIFFICULTY URINATING: 0
NEUROLOGICAL NEGATIVE: 1
RESPIRATORY NEGATIVE: 1
COUGH: 0
COLOR CHANGE: 0
PALPITATIONS: 0
ABDOMINAL PAIN: 0
UNEXPECTED WEIGHT CHANGE: 0
TROUBLE SWALLOWING: 0

## 2024-04-25 ASSESSMENT — PATIENT HEALTH QUESTIONNAIRE - PHQ9
SUM OF ALL RESPONSES TO PHQ9 QUESTIONS 1 AND 2: 0
2. FEELING DOWN, DEPRESSED OR HOPELESS: NOT AT ALL
1. LITTLE INTEREST OR PLEASURE IN DOING THINGS: NOT AT ALL

## 2024-04-25 ASSESSMENT — PAIN SCALES - GENERAL: PAINLEVEL: 0-NO PAIN

## 2024-04-25 NOTE — ASSESSMENT & PLAN NOTE
Patient with an obvious left inguinal hernia.  Patient with a low midline scar secondary to prostate surgery making laparoscopic repair trans abdominal extraperitoneal not viable option.  Recommend open left inguinal hernia repair with mesh.  Risk benefits alternatives of doing the surgery were thoroughly discussed with the patient agrees to proceed

## 2024-04-29 ENCOUNTER — TELEPHONE (OUTPATIENT)
Dept: PRIMARY CARE | Facility: CLINIC | Age: 79
End: 2024-04-29
Payer: MEDICARE

## 2024-04-29 DIAGNOSIS — E78.00 HIGH CHOLESTEROL: ICD-10-CM

## 2024-04-29 RX ORDER — GEMFIBROZIL 600 MG/1
600 TABLET, FILM COATED ORAL 2 TIMES DAILY
Qty: 180 TABLET | Refills: 3 | Status: SHIPPED | OUTPATIENT
Start: 2024-04-29 | End: 2025-04-29

## 2024-05-02 ENCOUNTER — PRE-ADMISSION TESTING (OUTPATIENT)
Dept: PREADMISSION TESTING | Facility: HOSPITAL | Age: 79
End: 2024-05-02
Payer: MEDICARE

## 2024-05-02 VITALS
DIASTOLIC BLOOD PRESSURE: 74 MMHG | OXYGEN SATURATION: 99 % | SYSTOLIC BLOOD PRESSURE: 149 MMHG | HEIGHT: 75 IN | WEIGHT: 170 LBS | HEART RATE: 70 BPM | RESPIRATION RATE: 16 BRPM | TEMPERATURE: 97.7 F | BODY MASS INDEX: 21.14 KG/M2

## 2024-05-02 DIAGNOSIS — Z01.818 PREOP TESTING: Primary | ICD-10-CM

## 2024-05-02 LAB
ATRIAL RATE: 72 BPM
Q ONSET: 209 MS
QRS COUNT: 11 BEATS
QRS DURATION: 146 MS
QT INTERVAL: 462 MS
QTC CALCULATION(BAZETT): 495 MS
QTC FREDERICIA: 484 MS
R AXIS: -84 DEGREES
T AXIS: 19 DEGREES
T OFFSET: 440 MS
VENTRICULAR RATE: 69 BPM

## 2024-05-02 PROCEDURE — 87081 CULTURE SCREEN ONLY: CPT | Mod: TRILAB,WESLAB

## 2024-05-02 PROCEDURE — 93005 ELECTROCARDIOGRAM TRACING: CPT

## 2024-05-02 PROCEDURE — 99203 OFFICE O/P NEW LOW 30 MIN: CPT

## 2024-05-02 PROCEDURE — 93010 ELECTROCARDIOGRAM REPORT: CPT | Performed by: INTERNAL MEDICINE

## 2024-05-02 RX ORDER — CHLORHEXIDINE GLUCONATE ORAL RINSE 1.2 MG/ML
SOLUTION DENTAL
Qty: 473 ML | Refills: 0 | Status: SHIPPED | OUTPATIENT
Start: 2024-05-14 | End: 2024-05-15

## 2024-05-02 ASSESSMENT — DUKE ACTIVITY SCORE INDEX (DASI)
CAN YOU TAKE CARE OF YOURSELF (EAT, DRESS, BATHE, OR USE TOILET): YES
CAN YOU DO MODERATE WORK AROUND THE HOUSE LIKE VACUUMING, SWEEPING FLOORS OR CARRYING GROCERIES: YES
CAN YOU DO HEAVY WORK AROUND THE HOUSE LIKE SCRUBBING FLOORS OR LIFTING AND MOVING HEAVY FURNITURE: YES
CAN YOU HAVE SEXUAL RELATIONS: YES
CAN YOU PARTICIPATE IN MODERATE RECREATIONAL ACTIVITIES LIKE GOLF, BOWLING, DANCING, DOUBLES TENNIS OR THROWING A BASEBALL OR FOOTBALL: YES
DASI METS SCORE: 9.9
CAN YOU DO LIGHT WORK AROUND THE HOUSE LIKE DUSTING OR WASHING DISHES: YES
CAN YOU CLIMB A FLIGHT OF STAIRS OR WALK UP A HILL: YES
CAN YOU PARTICIPATE IN STRENOUS SPORTS LIKE SWIMMING, SINGLES TENNIS, FOOTBALL, BASKETBALL, OR SKIING: YES
CAN YOU WALK INDOORS, SUCH AS AROUND YOUR HOUSE: YES
CAN YOU WALK A BLOCK OR TWO ON LEVEL GROUND: YES
CAN YOU RUN A SHORT DISTANCE: YES
TOTAL_SCORE: 58.2
CAN YOU DO YARD WORK LIKE RAKING LEAVES, WEEDING OR PUSHING A MOWER: YES

## 2024-05-02 ASSESSMENT — PAIN - FUNCTIONAL ASSESSMENT: PAIN_FUNCTIONAL_ASSESSMENT: 0-10

## 2024-05-02 ASSESSMENT — ENCOUNTER SYMPTOMS
CONSTITUTIONAL NEGATIVE: 1
ALLERGIC/IMMUNOLOGIC NEGATIVE: 1
HEMATOLOGIC/LYMPHATIC NEGATIVE: 1
ROS GI COMMENTS: LEFT INGUINAL HERNIA
PSYCHIATRIC NEGATIVE: 1
ENDOCRINE NEGATIVE: 1
RESPIRATORY NEGATIVE: 1
MUSCULOSKELETAL NEGATIVE: 1
NEUROLOGICAL NEGATIVE: 1

## 2024-05-02 ASSESSMENT — PAIN SCALES - GENERAL: PAINLEVEL_OUTOF10: 0 - NO PAIN

## 2024-05-02 NOTE — PREPROCEDURE INSTRUCTIONS
Medication List            Accurate as of May 2, 2024  8:58 AM. Always use your most recent med list.                CENTRUM SILVER ORAL  Medication Adjustments for Surgery: Stop 7 days before surgery     cyanocobalamin 100 mcg tablet  Commonly known as: Vitamin B-12  Medication Adjustments for Surgery: Stop 7 days before surgery     doxycycline 100 mg capsule  Commonly known as: Monodox  Take one pill by mouth twice daily with food. Take with at least 8 ounces (large glass) of water, do not lie down for 30 minutes after. Take for 7 days  Notes to patient: Not taking     Fish OiL 1,000 mg (120 mg-180 mg) capsule  Generic drug: omega 3-dha-epa-fish oil  Medication Adjustments for Surgery: Stop 7 days before surgery     gemfibrozil 600 mg tablet  Commonly known as: Lopid  Take 1 tablet (600 mg) by mouth 2 times a day.  Medication Adjustments for Surgery: Other (Comment)  Notes to patient: Hold dose day of surgery     * hydrocortisone 2.5 % cream  Apply topically 2 times a day as needed for irritation or rash.  Notes to patient: Can use if needed     * hydrocortisone 2.5 % ointment  Apply topically 2 times a day as needed for irritation or rash.  Notes to patient: Can use if needed     losartan 25 mg tablet  Commonly known as: Cozaar  Take 1 tablet (25 mg) by mouth once daily.  Medication Adjustments for Surgery: Other (Comment)  Notes to patient: Hold evening dose the day before surgery and hold dose day of surgery     melatonin 5 mg tablet,chewable  Medication Adjustments for Surgery: Other (Comment)  Notes to patient: Can take night before surgery if needed     metFORMIN  mg 24 hr tablet  Commonly known as: Glucophage-XR  Take 1 tablet (500 mg) by mouth once daily with a meal. Do not crush, chew, or split.  Medication Adjustments for Surgery: Other (Comment)  Notes to patient: Hold dose day of surgery     metoprolol tartrate 100 mg tablet  Commonly known as: Lopressor  Take 0.5 tablets (50 mg) by mouth 2  times a day. No further refills until seen in office for appointment  Medication Adjustments for Surgery: Take morning of surgery with sip of water, no other fluids     neomycin-polymyxin-HC 3.5-10,000-1 mg/mL-unit/mL-% otic suspension  Commonly known as: Cortisporin  Medication Adjustments for Surgery: Other (Comment)  Notes to patient: Hold day of surgery     nystatin 100,000 unit/gram powder  Commonly known as: Mycostatin  Apply topically 2 times a day.  Medication Adjustments for Surgery: Other (Comment)  Notes to patient: Hold day of surgery     nystatin-triamcinolone ointment  Commonly known as: Mycolog II  APPLY SPARINGLY TO Groin Rash TWICE A DAY as needed.  Medication Adjustments for Surgery: Other (Comment)  Notes to patient: Hold day of surgery     rivaroxaban 15 mg tablet  Commonly known as: Xarelto  Take 1 tablet (15 mg) by mouth once daily. Take with food.  Stop 2 days before surgery per physician instructions - last dose 5/12/24     simvastatin 20 mg tablet  Commonly known as: Zocor  Take 1 tablet (20 mg) by mouth once daily at bedtime.  Medication Adjustments for Surgery: Other (Comment)  Notes to patient: Can take the night before surgery     tobramycin 0.3 % ophthalmic solution  Commonly known as: Tobrex  Notes to patient: Not taking           * This list has 2 medication(s) that are the same as other medications prescribed for you. Read the directions carefully, and ask your doctor or other care provider to review them with you.                     Preoperative Fasting Guidelines    Why must I stop eating and drinking near surgery time?  With sedation, food or liquid in your stomach can enter your lungs causing serious complications  Increases nausea and vomiting    When do I need to stop eating and drinking before my surgery?  Do not eat any food or drink any liquids after midnight the night before your surgery/procedure.  You may have sips of water to take medications.    PAT DISCHARGE  INSTRUCTIONS    Please call the Same Day Surgery (SDS) Department of the hospital where your procedure will be performed after 2:00 PM the day before your surgery. If you are scheduled on a Monday, or a Tuesday following a Monday holiday, you will need to call on the last business day prior to your surgery.    [facility]  [address] [address], [address]  [phone]  [facility]  [address] [place] [address] [phone]  [facility]  [address]  [place] [id number]  [phone]    Please let your surgeon know if:      You develop any open sores, shingles, burning or painful urination as these may increase your risk of an infection.   You no longer wish to have the surgery.   Any other personal circumstances change that may lead to the need to cancel or defer this surgery-such as being sick or getting admitted to any hospital within one week of your planned procedure.    Your contact details change, such as a change of address or phone number.    Starting now:     Please DO NOT drink alcohol or smoke for 24 hours before surgery. It is well known that quitting smoking can make a huge difference to your health and recovery from surgery. The longer you abstain from smoking, the better your chances of a healthy recovery. If you need help with quitting, call 7-800-QUIT-NOW to be connected to a trained counselor who will discuss the best methods to help you quit.     Before your surgery:    Please stop all supplements 7 days prior to surgery. Or as directed by your surgeon.   Please stop taking NSAID pain medicine such as Advil and Motrin 7 days before surgery.    If you develop any fever, cough, cold, rashes, cuts, scratches, scrapes, urinary symptoms or infection anywhere on your body (including teeth and gums) prior to surgery, please call your surgeon’s  office as soon as possible. This may require treatment to reduce the chance of cancellation on the day of surgery.    The day before your surgery:   DIET- Please follow the diet instructions at the top of your packet.   Get a good night’s rest.  Use the special soap for bathing if you have been instructed to use one.    Scheduled surgery times may change and you will be notified if this occurs - please check your personal voicemail for any updates.     On the morning of surgery:   Wear comfortable, loose fitting clothes which open in the front. Please do not wear moisturizers, creams, lotions, makeup or perfume.    Please bring with you to surgery:   Photo ID and insurance card   Current list of medicines and allergies   Pacemaker/ Defibrillator/Heart stent cards   CPAP machine and mask    Slings/ splints/ crutches   A copy of your complete advanced directive/DHPOA.    Please do NOT bring with you to surgery:   All jewelry and valuables should be left at home.   Prosthetic devices such as contact lenses, hearing aids, dentures, eyelash extensions, hairpins and body piercings must be removed prior to going in to the surgical suite.    After outpatient surgery:   A responsible adult MUST accompany you at the time of discharge and stay with you for 24 hours after your surgery. You may NOT drive yourself home after surgery.    Do not drive, operate machinery, make critical decisions or do activities that require co-ordination or balance until after a night’s sleep.   Do not drink alcoholic beverages for 24 hours.   Instructions for resuming your medications will be provided by your surgeon.    CALL YOUR DOCTOR AFTER SURGERY IF YOU HAVE:     Chills and/or a fever of 101° F or higher.    Redness, swelling, pus or drainage from your surgical wound or a bad smell from the wound.    Lightheadedness, fainting or confusion.    Persistent vomiting (throwing up) and are not able to eat or drink for 12 hours.    Three or more  loose, watery bowel movements in 24 hours (diarrhea).   Difficulty or pain while urinating( after non-urological surgery)    Pain and swelling in your legs, especially if it is only on one side.    Difficulty breathing or are breathing faster than normal.    Any new concerning symptoms.      Patient Information: Pre-Operative Infection Prevention Measures     Why did I have my nose, under my arms, and groin swabbed?  The purpose of the swab is to identify Staphylococcus aureus inside your nose or on your skin.  The swab was sent to the laboratory for culture.  A positive swab/culture for Staphylococcus aureus is called colonization or carriage.      What is Staphylococcus aureus?  Staphylococcus aureus, also known as “staph”, is a germ found on the skin or in the nose of healthy people.  Sometimes Staphylococcus aureus can get into the body and cause an infection.  This can be minor (such as pimples, boils, or other skin problems).  It might also be serious (such as a blood infection, pneumonia, or a surgical site infection).    What is Staphylococcus aureus colonization or carriage?  Colonization or carriage means that a person has the germ but is not sick from it.  These bacteria can be spread on the hands or when breathing or sneezing.    How is Staphylococcus aureus spread?  It is most often spread by close contact with a person or item that carries it.    What happens if my culture is positive for Staphylococcus aureus?  Your doctor/medical team will use this information to guide any antibiotic treatment which may be necessary.  Regardless of the culture results, we will clean the inside of your nose with a betadine swab just before you have your surgery.      Will I get an infection if I have Staphylococcus aureus in my nose or on my skin?  Anyone can get an infection with Staphylococcus aureus.  However, the best way to reduce your risk of infection is to follow the instructions provided to you for the use of  your CHG soap and dental rinse.        Patient Information: Oral/Dental Rinse    What is oral/dental rinse?   It is a mouthwash. It is a way of cleaning the mouth with a germ-killing solution before your surgery.  The solution contains chlorhexidine, commonly known as CHG.   It is used inside the mouth to kill a bacteria known as Staphylococcus aureus.  Let your doctor know if you are allergic to Chlorhexidine.    Why do I need to use CHG oral/dental rinse?  The CHG oral/dental rinse helps to kill a bacteria in your mouth known as Staphylococcus aureus.     This reduces the risk of infection at the surgical site.      Using your CHG oral/dental rinse  STEPS:  Use your CHG oral/dental rinse after you brush your teeth the night before (at bedtime) and the morning of your surgery.  Follow all directions on your prescription label.    Use the cap on the container to measure 15ml   Swish (gargle if you can) the mouthwash in your mouth for at least 30 seconds, (do not swallow) and spit out  After you use your CHG rinse, do not rinse your mouth with water, drink or eat.  Please refer to the prescription label for the appropriate time to resume oral intake      What side effects might I have using the CHG oral/dental rinse?  CHG rinse will stick to plaque on the teeth.  Brush and floss just before use.  Teeth brushing will help avoid staining of plaque during use.      Patient Information: Home Preoperative Antibacterial Shower      What is a home preoperative antibacterial shower?  This shower is a way of cleaning the skin with a germ-killing solution before surgery.  The solution contains chlorhexidine, commonly known as CHG.  CHG is a skin cleanser with germ-killing ability.  Let your doctor know if you are allergic to chlorhexidine.    Why do I need to take a preoperative antibacterial shower?  Skin is not sterile.  It is best to try to make your skin as free of germs as possible before surgery.  Proper cleansing with a  germ-killing soap before surgery can lower the number of germs on your skin.  This helps to reduce the risk of infection at the surgical site.  Following the instructions listed below will help you prepare your skin for surgery.      How do I use the solution?  Steps:  Begin using your CHG soap 5 days before your scheduled surgery on ______5/11/24__________________.    First, wash and rinse your hair using the CHG soap. Keep CHG soap away from ear canals and eyes.  Rinse completely, do not condition.  Hair extensions should be removed.  Wash your face with your normal soap and rinse.    Apply the CHG solution to a clean wet washcloth.  Turn the water off or move away from the water spray to avoid premature rinsing of the CHG soap as you are applying.   Firmly lather your entire body from the neck down.  Do not use on your face.  Pay special attention to the area(s) where your incision(s) will be located unless they are on your face.  Avoid scrubbing your skin too hard.  The important point is to have the CHG soap sit on your skin for 3 minutes.    When the 3 minutes are up, turn on the water and rinse the CHG solution off your body completely.   DO NOT wash with regular soap after you have used the CHG soap solution  Pat yourself dry with a clean, freshly-laundered towel.  DO NOT apply powders, deodorants, or lotions.  Dress in clean, freshly laundered nightclothes.    Be sure to sleep with clean, freshly laundered sheets.  Be aware that CHG will cause stains on fabrics; if you wash them with bleach after use.  Rinse your washcloth and other linens that have contact with CHG completely.  Use only non-chlorine detergents to launder the items used.   The morning of surgery is the fifth day.  Repeat the above steps and dress in clean comfortable clothing     Whom should I contact if I have any questions regarding the use of CHG soap?  Call the [facility], [facility] Per[facility]  [facility] at [phone] if you have any questions.

## 2024-05-02 NOTE — CPM/PAT H&P
CPM/PAT Evaluation       Name: Jhonny Baron (Jhonny Baron)  /Age: 1945/78 y.o.     In-Person       Chief Complaint: Preadmission testing    HPI    Jhonny is an 78 year old male who presents today for preadmission testing for upcoming: Repair Inguinal Hernia, Left  Diagnosis: Left inguinal hernia  Surgeon: Dr. Ahumada    Patient reports that he developed a bulge in his left groin while at a social function about 2 months ago. He was seen by Dr. Ahumada and surgery was recommended for left inguinal hernia repair. He denies pain/discomfort.    Patient is in no acute distress and denies fever, chills, SOB, and chest pain.     Past Medical History:   Diagnosis Date    A-fib (Multi)     Alcohol abuse, uncomplicated     Alcohol abuse, episodic    Anxiety disorder, unspecified     Anxiety    Dermatochalasis of unspecified eye, unspecified eyelid 2020    Dermatochalasis    Dermatochalasis of unspecified eye, unspecified eyelid 2020    Dermatochalasis    Dermatochalasis of unspecified eye, unspecified eyelid 2020    Dermatochalasis    DM type 2 (diabetes mellitus, type 2) (Multi)     HTN (hypertension)     Male erectile dysfunction, unspecified     Inability to attain erection    Other conditions influencing health status     Prostate Cancer    Other specified diseases of liver     Hepatic cyst    Palpitations     Palpitations    Personal history of other diseases of male genital organs     History of benign prostatic hypertrophy    Personal history of other diseases of the circulatory system     History of hypertension    Personal history of other endocrine, nutritional and metabolic disease     History of hypercholesterolemia    Pinguecula, right eye 2020    Pinguecula of right eye    Pinguecula, right eye 2020    Pinguecula of right eye    Pinguecula, right eye 2020    Pinguecula of right eye    Pure hypercholesterolemia, unspecified 2022    High cholesterol        Past Surgical History:   Procedure Laterality Date    COLONOSCOPY N/A 2018    [facility]    COLONOSCOPY N/A 2006    Complete Colonoscopy    HERNIA REPAIR      Hernia Repair    OTHER SURGICAL HISTORY  11/27/2012    Renal Endoscopy Through Nephrostomy With Calculus Removal    PROSTATE SURGERY  11/27/2012    Prostate Surgery    TONSILLECTOMY  11/27/2012    Tonsillectomy     Social History     Tobacco Use    Smoking status: Former     Types: Cigars     Passive exposure: Past    Smokeless tobacco: Never   Substance Use Topics    Alcohol use: Yes     Comment: 1-2 beers per week      Patient Sexual activity questions deferred to the physician.    Family History   Problem Relation Name Age of Onset    Hypertension Mother      Hypertension Father         No Known Allergies    Current Outpatient Medications   Medication Instructions    [START ON 5/14/2024] chlorhexidine (Peridex) 0.12 % solution Use as directed.    cyanocobalamin (Vitamin B-12) 100 mcg tablet 1 tablet, oral, Daily    doxycycline (Monodox) 100 mg capsule Take one pill by mouth twice daily with food. Take with at least 8 ounces (large glass) of water, do not lie down for 30 minutes after. Take for 7 days    folic acid/multivit-min/lutein (CENTRUM SILVER ORAL) 1 tablet, oral, Daily    gemfibrozil (LOPID) 600 mg, oral, 2 times daily    hydrocortisone 2.5 % cream Topical, 2 times daily PRN    hydrocortisone 2.5 % ointment Topical, 2 times daily PRN    losartan (COZAAR) 25 mg, oral, Daily    melatonin 5 mg, oral, Nightly    metFORMIN XR (GLUCOPHAGE-XR) 500 mg, oral, Daily with breakfast, Do not crush, chew, or split.    metoprolol tartrate (LOPRESSOR) 50 mg, oral, 2 times daily, No further refills until seen in office for appointment    neomycin-polymyxin-HC (Cortisporin) 3.5-10,000-1 mg/mL-unit/mL-% otic suspension otic (ear)    nystatin (Mycostatin) 100,000 unit/gram powder Topical, 2 times daily    nystatin-triamcinolone (Mycolog II) ointment APPLY SPARINGLY  "TO Groin Rash TWICE A DAY as needed.    omega 3-dha-epa-fish oil (Fish OiL) 1,000 mg (120 mg-180 mg) capsule oral    rivaroxaban (XARELTO) 15 mg, oral, Daily, Take with food.    simvastatin (ZOCOR) 20 mg, oral, Nightly    tobramycin (Tobrex) 0.3 % ophthalmic solution ophthalmic (eye)        Review of Systems   Constitutional: Negative.    HENT: Negative.     Eyes:         Glasses   Respiratory: Negative.     Gastrointestinal:         Left Inguinal Hernia   Endocrine: Negative.    Genitourinary: Negative.    Musculoskeletal: Negative.    Skin: Negative.    Allergic/Immunologic: Negative.    Neurological: Negative.    Hematological: Negative.    Psychiatric/Behavioral: Negative.          /74   Pulse 70   Temp 36.5 °C (97.7 °F) (Temporal)   Resp 16   Ht 1.905 m (6' 3\")   Wt 77.1 kg (170 lb)   SpO2 99%   BMI 21.25 kg/m²     Physical Exam  Vitals reviewed.   HENT:      Head: Normocephalic and atraumatic.      Nose: Nose normal.      Mouth/Throat:      Mouth: Mucous membranes are moist.   Eyes:      Extraocular Movements: Extraocular movements intact.      Conjunctiva/sclera: Conjunctivae normal.      Pupils: Pupils are equal, round, and reactive to light.   Cardiovascular:      Rate and Rhythm: Normal rate. Rhythm irregular.      Pulses: Normal pulses.      Heart sounds: Murmur heard.   Pulmonary:      Effort: Pulmonary effort is normal.      Breath sounds: Normal breath sounds.   Abdominal:      General: Bowel sounds are normal.      Palpations: Abdomen is soft.   Genitourinary:     Comments: Deferred  Musculoskeletal:         General: Normal range of motion.      Cervical back: Normal range of motion and neck supple.   Skin:     General: Skin is warm and dry.   Neurological:      General: No focal deficit present.      Mental Status: He is alert and oriented to person, place, and time.   Psychiatric:         Mood and Affect: Mood normal.         Behavior: Behavior normal.          PAT AIRWAY:   Airway:     " Mallampati::  II   Denies loose/chipped teeth.      ASA: II  DASI: 58.2  METS: 9.9  CHADS: 3  RCRI: 0.4  Stop Bang: 3    Assessment and Plan:     1. Left inguinal hernia- repair inguinal hernia, left scheduled with Dr. Ahumada 5/15/24  2. AFIB- controlled followed by Dr. Darnell; last visit 3/18/24, taking Xarelto 15 mg daily (patient  told to hold 48 hours before surgery by cardiologist)  3. HTN-managed with metoprolol tartrate 50 mg BID  4. Dyslipidemia- managed with simvastatin 20 mg daily, gemfibrozil 600 mg BID  5. DMT2- A1C 5.9 11/22/23,taking metformin 500 mg daily    Alvaro Castellanos APRN-CNP        Addendum:  Cardiac confirmed EKG from PAT appointment reading:     Atrial fibrillation  Right bundle branch block  Left anterior fascicular block  - Bifascicular block   Abnormal ECG  When compared with ECG of 13-DEC-2006 11:52,  Atrial fibrillation has replaced Sinus rhythm  (RBBB and left anterior fascicular block) is now Present  Confirmed by Nawaf Wilson ([id number]) on 5/2/2024 11:29:23 AM        Comparison EKG was found scanned into EPIC media on 6/2/2015 from cardiologist's office:   Atrial fibrillation with V response of 69; rate varies, no measurable P's,; Bifasicicular block; right bundle branch block and LPFB; Right bundle branch block with right axis deviation.      Nadine Bhatia APRN-CNP

## 2024-05-03 NOTE — PROGRESS NOTES
Cardiac confirmed EKG from PAT appointment reading:    Atrial fibrillation  Right bundle branch block  Left anterior fascicular block  - Bifascicular block   Abnormal ECG  When compared with ECG of 13-DEC-2006 11:52,  Atrial fibrillation has replaced Sinus rhythm  (RBBB and left anterior fascicular block) is now Present  Confirmed by Nawaf Wilson ([id number]) on 5/2/2024 11:29:23 AM      Comparison EKG was found scanned into EPIC media on 6/2/2015 from cardiologist's office:   Atrial fibrillation with V response of 69; rate varies, no measurable P's,; Bifasicicular block; right bundle branch block and LPFB; Right bundle branch block with right axis deviation.       Nadine Bhatia, APRN-CNP

## 2024-05-04 LAB — STAPHYLOCOCCUS SPEC CULT: ABNORMAL

## 2024-05-15 ENCOUNTER — ANESTHESIA EVENT (OUTPATIENT)
Dept: OPERATING ROOM | Facility: HOSPITAL | Age: 79
End: 2024-05-15
Payer: MEDICARE

## 2024-05-15 ENCOUNTER — HOSPITAL ENCOUNTER (OUTPATIENT)
Facility: HOSPITAL | Age: 79
Setting detail: OUTPATIENT SURGERY
Discharge: HOME | End: 2024-05-15
Attending: SURGERY | Admitting: SURGERY
Payer: MEDICARE

## 2024-05-15 ENCOUNTER — ANESTHESIA (OUTPATIENT)
Dept: OPERATING ROOM | Facility: HOSPITAL | Age: 79
End: 2024-05-15
Payer: MEDICARE

## 2024-05-15 ENCOUNTER — PHARMACY VISIT (OUTPATIENT)
Dept: PHARMACY | Facility: CLINIC | Age: 79
End: 2024-05-15
Payer: COMMERCIAL

## 2024-05-15 VITALS
WEIGHT: 170 LBS | DIASTOLIC BLOOD PRESSURE: 70 MMHG | RESPIRATION RATE: 16 BRPM | SYSTOLIC BLOOD PRESSURE: 155 MMHG | TEMPERATURE: 96.8 F | HEIGHT: 75 IN | BODY MASS INDEX: 21.14 KG/M2 | OXYGEN SATURATION: 99 % | HEART RATE: 52 BPM

## 2024-05-15 DIAGNOSIS — K40.90 LEFT INGUINAL HERNIA: Primary | ICD-10-CM

## 2024-05-15 LAB — GLUCOSE BLD MANUAL STRIP-MCNC: 100 MG/DL (ref 74–99)

## 2024-05-15 PROCEDURE — 3700000001 HC GENERAL ANESTHESIA TIME - INITIAL BASE CHARGE: Performed by: SURGERY

## 2024-05-15 PROCEDURE — 49505 PRP I/HERN INIT REDUC >5 YR: CPT | Performed by: SURGERY

## 2024-05-15 PROCEDURE — 88302 TISSUE EXAM BY PATHOLOGIST: CPT | Performed by: PATHOLOGY

## 2024-05-15 PROCEDURE — 2500000005 HC RX 250 GENERAL PHARMACY W/O HCPCS: Performed by: SURGERY

## 2024-05-15 PROCEDURE — 7100000001 HC RECOVERY ROOM TIME - INITIAL BASE CHARGE: Performed by: SURGERY

## 2024-05-15 PROCEDURE — 2500000005 HC RX 250 GENERAL PHARMACY W/O HCPCS: Performed by: STUDENT IN AN ORGANIZED HEALTH CARE EDUCATION/TRAINING PROGRAM

## 2024-05-15 PROCEDURE — 3700000002 HC GENERAL ANESTHESIA TIME - EACH INCREMENTAL 1 MINUTE: Performed by: SURGERY

## 2024-05-15 PROCEDURE — 82947 ASSAY GLUCOSE BLOOD QUANT: CPT

## 2024-05-15 PROCEDURE — RXMED WILLOW AMBULATORY MEDICATION CHARGE

## 2024-05-15 PROCEDURE — 2500000004 HC RX 250 GENERAL PHARMACY W/ HCPCS (ALT 636 FOR OP/ED): Mod: JZ | Performed by: SURGERY

## 2024-05-15 PROCEDURE — 2500000004 HC RX 250 GENERAL PHARMACY W/ HCPCS (ALT 636 FOR OP/ED): Performed by: STUDENT IN AN ORGANIZED HEALTH CARE EDUCATION/TRAINING PROGRAM

## 2024-05-15 PROCEDURE — 2780000003 HC OR 278 NO HCPCS: Performed by: SURGERY

## 2024-05-15 PROCEDURE — 7100000009 HC PHASE TWO TIME - INITIAL BASE CHARGE: Performed by: SURGERY

## 2024-05-15 PROCEDURE — C1781 MESH (IMPLANTABLE): HCPCS | Performed by: SURGERY

## 2024-05-15 PROCEDURE — 7100000010 HC PHASE TWO TIME - EACH INCREMENTAL 1 MINUTE: Performed by: SURGERY

## 2024-05-15 PROCEDURE — 3600000008 HC OR TIME - EACH INCREMENTAL 1 MINUTE - PROCEDURE LEVEL THREE: Performed by: SURGERY

## 2024-05-15 PROCEDURE — 2720000007 HC OR 272 NO HCPCS: Performed by: SURGERY

## 2024-05-15 PROCEDURE — 7100000002 HC RECOVERY ROOM TIME - EACH INCREMENTAL 1 MINUTE: Performed by: SURGERY

## 2024-05-15 PROCEDURE — 88302 TISSUE EXAM BY PATHOLOGIST: CPT | Mod: TC | Performed by: SURGERY

## 2024-05-15 PROCEDURE — 3600000003 HC OR TIME - INITIAL BASE CHARGE - PROCEDURE LEVEL THREE: Performed by: SURGERY

## 2024-05-15 DEVICE — POLYPROPYLENE PARTIALLY ABSORBABLE LARGE BLUE / UNDYED MESH DEVICE
Type: IMPLANTABLE DEVICE | Site: INGUINAL | Status: FUNCTIONAL
Brand: ULTRAPRO

## 2024-05-15 RX ORDER — FENTANYL CITRATE 50 UG/ML
INJECTION, SOLUTION INTRAMUSCULAR; INTRAVENOUS AS NEEDED
Status: DISCONTINUED | OUTPATIENT
Start: 2024-05-15 | End: 2024-05-15

## 2024-05-15 RX ORDER — ONDANSETRON HYDROCHLORIDE 2 MG/ML
4 INJECTION, SOLUTION INTRAVENOUS ONCE AS NEEDED
Status: DISCONTINUED | OUTPATIENT
Start: 2024-05-15 | End: 2024-05-15 | Stop reason: HOSPADM

## 2024-05-15 RX ORDER — IPRATROPIUM BROMIDE 0.5 MG/2.5ML
500 SOLUTION RESPIRATORY (INHALATION) AS NEEDED
Status: DISCONTINUED | OUTPATIENT
Start: 2024-05-15 | End: 2024-05-15 | Stop reason: HOSPADM

## 2024-05-15 RX ORDER — LIDOCAINE HYDROCHLORIDE AND EPINEPHRINE 10; 10 MG/ML; UG/ML
INJECTION, SOLUTION INFILTRATION; PERINEURAL AS NEEDED
Status: DISCONTINUED | OUTPATIENT
Start: 2024-05-15 | End: 2024-05-15 | Stop reason: HOSPADM

## 2024-05-15 RX ORDER — CEFAZOLIN SODIUM 2 G/100ML
2 INJECTION, SOLUTION INTRAVENOUS ONCE
Status: COMPLETED | OUTPATIENT
Start: 2024-05-15 | End: 2024-05-15

## 2024-05-15 RX ORDER — LABETALOL HYDROCHLORIDE 5 MG/ML
5 INJECTION, SOLUTION INTRAVENOUS ONCE AS NEEDED
Status: DISCONTINUED | OUTPATIENT
Start: 2024-05-15 | End: 2024-05-15 | Stop reason: HOSPADM

## 2024-05-15 RX ORDER — FENTANYL CITRATE 50 UG/ML
25 INJECTION, SOLUTION INTRAMUSCULAR; INTRAVENOUS EVERY 5 MIN PRN
Status: DISCONTINUED | OUTPATIENT
Start: 2024-05-15 | End: 2024-05-15 | Stop reason: HOSPADM

## 2024-05-15 RX ORDER — SODIUM CHLORIDE, SODIUM LACTATE, POTASSIUM CHLORIDE, CALCIUM CHLORIDE 600; 310; 30; 20 MG/100ML; MG/100ML; MG/100ML; MG/100ML
100 INJECTION, SOLUTION INTRAVENOUS CONTINUOUS
Status: DISCONTINUED | OUTPATIENT
Start: 2024-05-15 | End: 2024-05-15 | Stop reason: HOSPADM

## 2024-05-15 RX ORDER — MEPERIDINE HYDROCHLORIDE 25 MG/ML
12.5 INJECTION INTRAMUSCULAR; INTRAVENOUS; SUBCUTANEOUS EVERY 10 MIN PRN
Status: DISCONTINUED | OUTPATIENT
Start: 2024-05-15 | End: 2024-05-15 | Stop reason: HOSPADM

## 2024-05-15 RX ORDER — DIPHENHYDRAMINE HYDROCHLORIDE 50 MG/ML
12.5 INJECTION INTRAMUSCULAR; INTRAVENOUS ONCE AS NEEDED
Status: DISCONTINUED | OUTPATIENT
Start: 2024-05-15 | End: 2024-05-15 | Stop reason: HOSPADM

## 2024-05-15 RX ORDER — PROCHLORPERAZINE EDISYLATE 5 MG/ML
5 INJECTION INTRAMUSCULAR; INTRAVENOUS ONCE AS NEEDED
Status: DISCONTINUED | OUTPATIENT
Start: 2024-05-15 | End: 2024-05-15 | Stop reason: HOSPADM

## 2024-05-15 RX ORDER — OXYCODONE AND ACETAMINOPHEN 5; 325 MG/1; MG/1
1 TABLET ORAL EVERY 4 HOURS PRN
Qty: 30 TABLET | Refills: 0 | Status: SHIPPED | OUTPATIENT
Start: 2024-05-15

## 2024-05-15 RX ORDER — HYDRALAZINE HYDROCHLORIDE 20 MG/ML
5 INJECTION INTRAMUSCULAR; INTRAVENOUS EVERY 30 MIN PRN
Status: DISCONTINUED | OUTPATIENT
Start: 2024-05-15 | End: 2024-05-15 | Stop reason: HOSPADM

## 2024-05-15 RX ORDER — ALBUTEROL SULFATE 0.83 MG/ML
2.5 SOLUTION RESPIRATORY (INHALATION) ONCE AS NEEDED
Status: DISCONTINUED | OUTPATIENT
Start: 2024-05-15 | End: 2024-05-15 | Stop reason: HOSPADM

## 2024-05-15 RX ORDER — PROPOFOL 10 MG/ML
INJECTION, EMULSION INTRAVENOUS AS NEEDED
Status: DISCONTINUED | OUTPATIENT
Start: 2024-05-15 | End: 2024-05-15

## 2024-05-15 RX ORDER — FENTANYL CITRATE 50 UG/ML
50 INJECTION, SOLUTION INTRAMUSCULAR; INTRAVENOUS EVERY 5 MIN PRN
Status: DISCONTINUED | OUTPATIENT
Start: 2024-05-15 | End: 2024-05-15 | Stop reason: HOSPADM

## 2024-05-15 RX ORDER — BUPIVACAINE HYDROCHLORIDE 5 MG/ML
INJECTION, SOLUTION PERINEURAL AS NEEDED
Status: DISCONTINUED | OUTPATIENT
Start: 2024-05-15 | End: 2024-05-15 | Stop reason: HOSPADM

## 2024-05-15 RX ORDER — LIDOCAINE HYDROCHLORIDE 10 MG/ML
INJECTION INFILTRATION; PERINEURAL AS NEEDED
Status: DISCONTINUED | OUTPATIENT
Start: 2024-05-15 | End: 2024-05-15

## 2024-05-15 RX ADMIN — SODIUM CHLORIDE, POTASSIUM CHLORIDE, SODIUM LACTATE AND CALCIUM CHLORIDE: 600; 310; 30; 20 INJECTION, SOLUTION INTRAVENOUS at 08:14

## 2024-05-15 RX ADMIN — FENTANYL CITRATE 25 MCG: 0.05 INJECTION, SOLUTION INTRAMUSCULAR; INTRAVENOUS at 08:28

## 2024-05-15 RX ADMIN — PROPOFOL 30 MG: 10 INJECTION, EMULSION INTRAVENOUS at 08:33

## 2024-05-15 RX ADMIN — FENTANYL CITRATE 25 MCG: 0.05 INJECTION, SOLUTION INTRAMUSCULAR; INTRAVENOUS at 08:33

## 2024-05-15 RX ADMIN — FENTANYL CITRATE 50 MCG: 0.05 INJECTION, SOLUTION INTRAMUSCULAR; INTRAVENOUS at 08:36

## 2024-05-15 RX ADMIN — FENTANYL CITRATE 25 MCG: 50 INJECTION INTRAMUSCULAR; INTRAVENOUS at 10:23

## 2024-05-15 RX ADMIN — PROPOFOL 50 MG: 10 INJECTION, EMULSION INTRAVENOUS at 08:23

## 2024-05-15 RX ADMIN — FENTANYL CITRATE 25 MCG: 50 INJECTION INTRAMUSCULAR; INTRAVENOUS at 10:11

## 2024-05-15 RX ADMIN — LIDOCAINE HYDROCHLORIDE 5 ML: 10 INJECTION, SOLUTION INFILTRATION; PERINEURAL at 08:21

## 2024-05-15 RX ADMIN — Medication 15 L/MIN: at 09:19

## 2024-05-15 RX ADMIN — PROPOFOL 120 MG: 10 INJECTION, EMULSION INTRAVENOUS at 08:21

## 2024-05-15 RX ADMIN — CEFAZOLIN SODIUM 2 G: 2 INJECTION, SOLUTION INTRAVENOUS at 08:25

## 2024-05-15 ASSESSMENT — PAIN DESCRIPTION - LOCATION
LOCATION: GROIN
LOCATION: GROIN

## 2024-05-15 ASSESSMENT — PAIN SCALES - GENERAL
PAINLEVEL_OUTOF10: 0 - NO PAIN
PAINLEVEL_OUTOF10: 0 - NO PAIN
PAINLEVEL_OUTOF10: 6
PAINLEVEL_OUTOF10: 3
PAINLEVEL_OUTOF10: 0 - NO PAIN
PAINLEVEL_OUTOF10: 4
PAINLEVEL_OUTOF10: 6
PAINLEVEL_OUTOF10: 4

## 2024-05-15 ASSESSMENT — PAIN - FUNCTIONAL ASSESSMENT
PAIN_FUNCTIONAL_ASSESSMENT: 0-10
PAIN_FUNCTIONAL_ASSESSMENT: FLACC (FACE, LEGS, ACTIVITY, CRY, CONSOLABILITY)
PAIN_FUNCTIONAL_ASSESSMENT: 0-10
PAIN_FUNCTIONAL_ASSESSMENT: FLACC (FACE, LEGS, ACTIVITY, CRY, CONSOLABILITY)

## 2024-05-15 ASSESSMENT — PAIN DESCRIPTION - ORIENTATION
ORIENTATION: LEFT
ORIENTATION: LEFT

## 2024-05-15 ASSESSMENT — COLUMBIA-SUICIDE SEVERITY RATING SCALE - C-SSRS
1. IN THE PAST MONTH, HAVE YOU WISHED YOU WERE DEAD OR WISHED YOU COULD GO TO SLEEP AND NOT WAKE UP?: NO
6. HAVE YOU EVER DONE ANYTHING, STARTED TO DO ANYTHING, OR PREPARED TO DO ANYTHING TO END YOUR LIFE?: NO
2. HAVE YOU ACTUALLY HAD ANY THOUGHTS OF KILLING YOURSELF?: NO

## 2024-05-15 NOTE — ANESTHESIA PROCEDURE NOTES
Airway  Date/Time: 5/15/2024 8:24 AM  Urgency: elective    Airway not difficult    Staffing  Performed: attending   Authorized by: Toño Booker MD    Performed by: Toño Booker MD  Patient location during procedure: OR    Indications and Patient Condition  Indications for airway management: anesthesia  Spontaneous ventilation: present  Sedation level: deep  Preoxygenated: yes  Mask difficulty assessment: 0 - not attempted    Final Airway Details  Final airway type: supraglottic airway      Successful airway: classic  Size 4     Number of attempts at approach: 1           20

## 2024-05-15 NOTE — ANESTHESIA POSTPROCEDURE EVALUATION
Patient: Jhonny Baron    Procedure Summary       Date: 05/15/24 Room / Location: [facility] OR 02 / [facility] OR    Anesthesia Start: 0815 Anesthesia Stop: 0920    Procedure: Open Repair Inguinal Hernia with Mesh (Left) Diagnosis:       Left inguinal hernia      (Left inguinal hernia [K40.90])    Surgeons: Heather Ahumada MD Responsible Provider: Toño Booker MD    Anesthesia Type: general ASA Status: 2            Anesthesia Type: general    Vitals Value Taken Time   /82 05/15/24 1056   Temp 36 °C (96.8 °F) 05/15/24 1053   Pulse 53 05/15/24 1057   Resp 18 05/15/24 1057   SpO2 96 % 05/15/24 1057   Vitals shown include unfiled device data.    Anesthesia Post Evaluation    Patient location during evaluation: PACU  Patient participation: complete - patient participated  Level of consciousness: awake  Pain management: adequate  Multimodal analgesia pain management approach  Airway patency: patent  Cardiovascular status: acceptable and hemodynamically stable  Respiratory status: acceptable and spontaneous ventilation  Hydration status: euvolemic  Postoperative Nausea and Vomiting: none  Comments: No nausea or vomiting        There were no known notable events for this encounter.

## 2024-05-15 NOTE — ANESTHESIA PREPROCEDURE EVALUATION
Patient: Jhonny Baron    Procedure Information       Date/Time: 05/15/24 0800    Procedure: Repair Inguinal Hernia (Left)    [facility]: [facility] / [facility]    Surgeons: Heather Ahumada MD            Relevant Problems   Anesthesia (within normal limits)      Cardiac   (+) Atrial fibrillation (Multi)   (+) First degree atrioventricular block   (+) High cholesterol   (+) Hypertension   (+) Right bundle branch block   (-) History of coronary artery bypass graft   (-) Pacemaker      Pulmonary   (-) Asthma (HHS-HCC)   (-) Chronic obstructive pulmonary disease (Multi)   (-) JENNA (obstructive sleep apnea)      Neuro   (+) Anxiety with flying   (-) CVA (cerebral vascular accident) (Multi)   (-) Seizures (Multi)      /Renal   (+) Prostate cancer (Multi)      Endocrine   (+) Type 2 diabetes mellitus without complication, without long-term current use of insulin (Multi)      Hematology   (-) Coagulopathy (Multi)      Musculoskeletal   (+) Osteoarthritis      Skin   (+) Atopic dermatitis, unspecified   (+) Skin cancer of face       Clinical information reviewed:   Tobacco  Allergies  Meds  Problems  Med Hx  Surg Hx   Fam Hx  Soc   Hx        NPO Detail:  NPO/Void Status  Carbohydrate Drink Given Prior to Surgery? : N  Date of Last Liquid: 05/14/24  Date of Last Solid: 05/14/24         Physical Exam    Airway  Mallampati: II  TM distance: >3 FB  Neck ROM: full     Cardiovascular    Dental    Pulmonary    Abdominal            Anesthesia Plan    History of general anesthesia?: yes  History of complications of general anesthesia?: no    ASA 2     general     intravenous induction   Postoperative administration of opioids is intended.  Anesthetic plan and risks discussed with patient.

## 2024-05-15 NOTE — OP NOTE
Open Repair Inguinal Hernia with Mesh (L) Operative Note     Date: 5/15/2024  OR Location: [facility]    Name: Jhonny Baron, : 1945, Age: 78 y.o., MRN: [id number], Sex: male    Diagnosis  Pre-op Diagnosis     * Left inguinal hernia [K40.90] Post-op Diagnosis     * Left inguinal hernia [K40.90]     Procedures  Open Repair Inguinal Hernia with Mesh  16572 - [place] RPR 1ST INGUN HRNA AGE 5 YRS/> REDUCIBLE      Surgeons      * Heather Ahumada - Primary    Resident/Fellow/Other Assistant:  Surgeons and Role:  * No surgeons found with a matching role *    Procedure Summary  Anesthesia: General  ASA: II  Anesthesia Staff: Anesthesiologist: Toño Booker MD  Estimated Blood Loss: 2mL  Intra-op Medications:   Administrations occurring from 0800 to 0930 on 05/15/24:   Medication Name Total Dose   BUPivacaine HCl (Marcaine) 0.5 % (5 mg/mL) injection 5 mL   lidocaine-epinephrine (Xylocaine W/EPI) 1 %-1:100,000 injection 5 mL   lactated Ringer's infusion Cannot be calculated   ceFAZolin in dextrose (iso-os) (Ancef) IVPB 2 g 2 g              Anesthesia Record               Intraprocedure I/O Totals       None           Specimen:   ID Type Source Tests Collected by Time   1 : left inguinal hernia sac and contents Tissue HERNIA SAC SURGICAL PATHOLOGY EXAM Heather Ahumada MD 5/15/2024 0842        Staff:   Circulator: Froilan Chambers RN  Scrub Person: Crista Baker; Radames Lozano RN         Drains and/or Catheters: * None in log *    Tourniquet Times:         Implants:  Implants       Type Name Action Serial No.      Surgical Mesh Sling Implant MESH, ULTRAPRO, HERNIA, LARGE, LF - [id number] Implanted               Findings: Patient with an indirect hernia sac with excess sac excised.  Cord lipoma excised.  Also direct space weakness.    Indications: Jhonny Baron is an 78 y.o. male who is having surgery for Left inguinal hernia [K40.90].     The patient was seen in the preoperative area. The risks, benefits,  complications, treatment options, non-operative alternatives, expected recovery and outcomes were discussed with the patient. The possibilities of reaction to medication, pulmonary aspiration, injury to surrounding structures, bleeding, recurrent infection, the need for additional procedures, failure to diagnose a condition, and creating a complication requiring transfusion or operation were discussed with the patient. The patient concurred with the proposed plan, giving informed consent.  The site of surgery was properly noted/marked if necessary per policy. The patient has been actively warmed in preoperative area. Preoperative antibiotics have been ordered and given within 1 hours of incision. Venous thrombosis prophylaxis have been ordered including bilateral sequential compression devices    Procedure Details: Patient brought to Room in the supine position.  General anesthesia was obtained with LMA.  The left  groin was clipped prepped and draped in a sterile fashion.  Marking pen was used to delineate the line of incision from the anterior pubic tubercle out superior laterally.  25% Marcaine mixed with 1% lidocaine with epi infiltrated the dermis.  15 blade scalp was used to make a 5 centimeter incision in the skin.  Underlying subcutaneous tissue  using electrocautery.  External oblique fibers were cleaned off using electrocautery.  External oblique fibers were then incised in flayed open using Metzenbaum scissors.  Cord structures was then dissected off the floor of the canal.  Penrose loop was placed around the cord structures.  The was a large indirect hernia sac that was  from the cord structures.  Excess sac was excised along with a cord lipoma.  The cord lipoma was clamped high up at the internal ring excised and tied off with 3-0 Vicryl.  The excess hernia sac was also excised and the defect was then closed using a running locking 3-0 PDS suture.It was imbricated into the  preperitoneal space for closure.  There was a direct space hernia as well bulging out.   The inferior epigastric vessels were then dissected.    Ultralight Prolene mesh system was chosen for the defect.  It was placed in the defect with the underlay opening up in the preperitoneal space in the overlay being placed on the floor the canal.  The epigastric vessels were then released from the vessel loop.  The overlay was sewn to the tissues overlying the anterior pubic tubercle inferiorly medially to the conjoined tendon and laterally to the shelving edge of the inguinal ligament.  A slit was made to accommodate cord structures.  Next the external oblique fibers were closed in a running fashion with 3 0 Vicryl over the mesh.  Local was infiltrated into the surrounding tissues.  Interrupted 3 0 Vicryl for Maribel's fascia.  Interrupted 3 0 Vicryl for the dermis.  Running 4 0 Monocryl suture for the skin.  Steri-Strips and sterile dressing applied patient tolerated procedure well patient's LMA was removed in the operating room and he was transferred to recovery room with rails up all counts were good this concludes dictation    Complications:  None; patient tolerated the procedure well.    Disposition: PACU - hemodynamically stable.  Condition: stable         Additional Details:     Attending Attestation: I was present and scrubbed for the entire procedure.    Heather Ahumada  Phone Number: [phone]

## 2024-05-15 NOTE — POST-PROCEDURE NOTE
1100- pt brought back from pacu,verbal report received. Pt is alert and awake. Family at bedside. Snack and drink given. Rx to go meds with family.    1130- vss. Discharge instructions given and explained to pt and family. All verbally understood.    1133- pt ambulated to BR with this nurse with steady gait.    1138- pt getting dressed at this time with assistance of wife.    1149- volunteer at bedside with wheelchair for discharge.

## 2024-05-16 LAB
LABORATORY COMMENT REPORT: NORMAL
PATH REPORT.FINAL DX SPEC: NORMAL
PATH REPORT.GROSS SPEC: NORMAL
PATH REPORT.RELEVANT HX SPEC: NORMAL
PATH REPORT.TOTAL CANCER: NORMAL

## 2024-05-16 ASSESSMENT — PAIN SCALES - GENERAL: PAINLEVEL_OUTOF10: 2

## 2024-05-28 NOTE — PROGRESS NOTES
Subjective   Patient ID: Jhonny Baron is a 78 y.o. male who presents for post op.  HPI  S/P Open Repair Inguinal Hernia with Mesh on 5/15/24.  Patient eating well normal bowel movements.  Feels a little bloated.  No trouble with urination.  FINAL DIAGNOSIS      SOFT TISSUE, LEFT INGUINAL HERNIA SAC, EXCISION:  HERNIA SAC WITH FOCAL CHRONIC INFLAMMATION.  MATURE ADIPOSE TISSUE.       Review of Systems      Social History:  Tobacco Use - yes cigars  Do you use any recreational drugs -no  Alcohol Use - yes  Caffeine Intake - coffee, pop  Working - no  Marital status -   Living with - wife              Objective   Physical Exam  Incision clean dry and intact.  Assessment/Plan   Problem List Items Addressed This Visit             ICD-10-CM    Left inguinal hernia - Primary K40.90     Patient is status post open left inguinal hernia repair with mesh.  Patient with excellent healing.  Patient needs to follow-up as needed                 Heather Ahumada MD 05/30/24 1:37 PM

## 2024-05-30 ENCOUNTER — OFFICE VISIT (OUTPATIENT)
Dept: SURGERY | Facility: CLINIC | Age: 79
End: 2024-05-30
Payer: MEDICARE

## 2024-05-30 VITALS
HEIGHT: 75 IN | BODY MASS INDEX: 21.14 KG/M2 | HEART RATE: 75 BPM | SYSTOLIC BLOOD PRESSURE: 158 MMHG | WEIGHT: 170 LBS | OXYGEN SATURATION: 98 % | DIASTOLIC BLOOD PRESSURE: 91 MMHG | TEMPERATURE: 97.1 F

## 2024-05-30 DIAGNOSIS — K40.90 LEFT INGUINAL HERNIA: Primary | ICD-10-CM

## 2024-05-30 PROCEDURE — 3077F SYST BP >= 140 MM HG: CPT | Performed by: SURGERY

## 2024-05-30 PROCEDURE — 3080F DIAST BP >= 90 MM HG: CPT | Performed by: SURGERY

## 2024-05-30 PROCEDURE — 1126F AMNT PAIN NOTED NONE PRSNT: CPT | Performed by: SURGERY

## 2024-05-30 PROCEDURE — 1160F RVW MEDS BY RX/DR IN RCRD: CPT | Performed by: SURGERY

## 2024-05-30 PROCEDURE — 1036F TOBACCO NON-USER: CPT | Performed by: SURGERY

## 2024-05-30 PROCEDURE — 1157F ADVNC CARE PLAN IN RCRD: CPT | Performed by: SURGERY

## 2024-05-30 PROCEDURE — 1159F MED LIST DOCD IN RCRD: CPT | Performed by: SURGERY

## 2024-05-30 PROCEDURE — 99024 POSTOP FOLLOW-UP VISIT: CPT | Performed by: SURGERY

## 2024-05-30 ASSESSMENT — ENCOUNTER SYMPTOMS
OCCASIONAL FEELINGS OF UNSTEADINESS: 0
LOSS OF SENSATION IN FEET: 0
DEPRESSION: 0

## 2024-05-30 ASSESSMENT — COLUMBIA-SUICIDE SEVERITY RATING SCALE - C-SSRS
1. IN THE PAST MONTH, HAVE YOU WISHED YOU WERE DEAD OR WISHED YOU COULD GO TO SLEEP AND NOT WAKE UP?: NO
2. HAVE YOU ACTUALLY HAD ANY THOUGHTS OF KILLING YOURSELF?: NO
6. HAVE YOU EVER DONE ANYTHING, STARTED TO DO ANYTHING, OR PREPARED TO DO ANYTHING TO END YOUR LIFE?: NO

## 2024-05-30 ASSESSMENT — PATIENT HEALTH QUESTIONNAIRE - PHQ9
1. LITTLE INTEREST OR PLEASURE IN DOING THINGS: NOT AT ALL
2. FEELING DOWN, DEPRESSED OR HOPELESS: NOT AT ALL
SUM OF ALL RESPONSES TO PHQ9 QUESTIONS 1 & 2: 0

## 2024-05-30 ASSESSMENT — PAIN SCALES - GENERAL: PAINLEVEL: 0-NO PAIN

## 2024-05-30 NOTE — ASSESSMENT & PLAN NOTE
Patient is status post open left inguinal hernia repair with mesh.  Patient with excellent healing.  Patient needs to follow-up as needed

## 2024-06-03 DIAGNOSIS — R73.03 PREDIABETES: ICD-10-CM

## 2024-06-03 RX ORDER — METFORMIN HYDROCHLORIDE 500 MG/1
500 TABLET, EXTENDED RELEASE ORAL
Qty: 90 TABLET | Refills: 3 | Status: SHIPPED | OUTPATIENT
Start: 2024-06-03 | End: 2025-06-03

## 2024-08-06 ENCOUNTER — APPOINTMENT (OUTPATIENT)
Dept: PRIMARY CARE | Facility: CLINIC | Age: 79
End: 2024-08-06
Payer: MEDICARE

## 2024-08-27 ENCOUNTER — LAB (OUTPATIENT)
Dept: LAB | Facility: LAB | Age: 79
End: 2024-08-27
Payer: MEDICARE

## 2024-08-27 ENCOUNTER — APPOINTMENT (OUTPATIENT)
Dept: PRIMARY CARE | Facility: CLINIC | Age: 79
End: 2024-08-27
Payer: MEDICARE

## 2024-08-27 VITALS
HEIGHT: 75 IN | BODY MASS INDEX: 21.29 KG/M2 | SYSTOLIC BLOOD PRESSURE: 144 MMHG | DIASTOLIC BLOOD PRESSURE: 78 MMHG | WEIGHT: 171.2 LBS | OXYGEN SATURATION: 97 % | HEART RATE: 56 BPM | RESPIRATION RATE: 16 BRPM | TEMPERATURE: 98.2 F

## 2024-08-27 DIAGNOSIS — R26.81 GAIT INSTABILITY: ICD-10-CM

## 2024-08-27 DIAGNOSIS — I10 PRIMARY HYPERTENSION: ICD-10-CM

## 2024-08-27 DIAGNOSIS — E11.9 TYPE 2 DIABETES MELLITUS WITHOUT COMPLICATION, WITHOUT LONG-TERM CURRENT USE OF INSULIN (MULTI): ICD-10-CM

## 2024-08-27 DIAGNOSIS — Z00.00 MEDICARE ANNUAL WELLNESS VISIT, SUBSEQUENT: ICD-10-CM

## 2024-08-27 DIAGNOSIS — L25.9 CONTACT DERMATITIS AND ECZEMA: ICD-10-CM

## 2024-08-27 DIAGNOSIS — Z12.5 SPECIAL SCREENING FOR MALIGNANT NEOPLASM OF PROSTATE: ICD-10-CM

## 2024-08-27 DIAGNOSIS — Z87.891 FORMER CIGAR SMOKER: ICD-10-CM

## 2024-08-27 DIAGNOSIS — F51.01 PRIMARY INSOMNIA: ICD-10-CM

## 2024-08-27 DIAGNOSIS — E78.00 HIGH CHOLESTEROL: ICD-10-CM

## 2024-08-27 DIAGNOSIS — K40.90 LEFT INGUINAL HERNIA: ICD-10-CM

## 2024-08-27 DIAGNOSIS — I48.21 PERMANENT ATRIAL FIBRILLATION (MULTI): Primary | ICD-10-CM

## 2024-08-27 DIAGNOSIS — L30.9 DERMATITIS, UNSPECIFIED: ICD-10-CM

## 2024-08-27 LAB
ALBUMIN SERPL BCP-MCNC: 4.6 G/DL (ref 3.4–5)
ALP SERPL-CCNC: 70 U/L (ref 33–136)
ALT SERPL W P-5'-P-CCNC: 14 U/L (ref 10–52)
ANION GAP SERPL CALC-SCNC: 17 MMOL/L (ref 10–20)
AST SERPL W P-5'-P-CCNC: 22 U/L (ref 9–39)
BILIRUB SERPL-MCNC: 1.1 MG/DL (ref 0–1.2)
BUN SERPL-MCNC: 28 MG/DL (ref 6–23)
CALCIUM SERPL-MCNC: 9.6 MG/DL (ref 8.6–10.3)
CHLORIDE SERPL-SCNC: 105 MMOL/L (ref 98–107)
CHOLEST SERPL-MCNC: 120 MG/DL (ref 0–199)
CHOLESTEROL/HDL RATIO: 3.2
CO2 SERPL-SCNC: 26 MMOL/L (ref 21–32)
CREAT SERPL-MCNC: 1.25 MG/DL (ref 0.5–1.3)
EGFRCR SERPLBLD CKD-EPI 2021: 58 ML/MIN/1.73M*2
ERYTHROCYTE [DISTWIDTH] IN BLOOD BY AUTOMATED COUNT: 14.9 % (ref 11.5–14.5)
GLUCOSE SERPL-MCNC: 137 MG/DL (ref 74–99)
HCT VFR BLD AUTO: 41.8 % (ref 41–52)
HDLC SERPL-MCNC: 37.7 MG/DL
HGB BLD-MCNC: 13.4 G/DL (ref 13.5–17.5)
MCH RBC QN AUTO: 29.6 PG (ref 26–34)
MCHC RBC AUTO-ENTMCNC: 32.1 G/DL (ref 32–36)
MCV RBC AUTO: 93 FL (ref 80–100)
NON-HDL CHOLESTEROL: 82 MG/DL (ref 0–149)
NRBC BLD-RTO: 0 /100 WBCS (ref 0–0)
PLATELET # BLD AUTO: 642 X10*3/UL (ref 150–450)
POTASSIUM SERPL-SCNC: 4.5 MMOL/L (ref 3.5–5.3)
PROT SERPL-MCNC: 7.2 G/DL (ref 6.4–8.2)
RBC # BLD AUTO: 4.52 X10*6/UL (ref 4.5–5.9)
SODIUM SERPL-SCNC: 143 MMOL/L (ref 136–145)
TSH SERPL-ACNC: 1.2 MIU/L (ref 0.44–3.98)
WBC # BLD AUTO: 7.7 X10*3/UL (ref 4.4–11.3)

## 2024-08-27 PROCEDURE — 1036F TOBACCO NON-USER: CPT | Performed by: FAMILY MEDICINE

## 2024-08-27 PROCEDURE — 1160F RVW MEDS BY RX/DR IN RCRD: CPT | Performed by: FAMILY MEDICINE

## 2024-08-27 PROCEDURE — 83036 HEMOGLOBIN GLYCOSYLATED A1C: CPT

## 2024-08-27 PROCEDURE — 1158F ADVNC CARE PLAN TLK DOCD: CPT | Performed by: FAMILY MEDICINE

## 2024-08-27 PROCEDURE — 1157F ADVNC CARE PLAN IN RCRD: CPT | Performed by: FAMILY MEDICINE

## 2024-08-27 PROCEDURE — 1170F FXNL STATUS ASSESSED: CPT | Performed by: FAMILY MEDICINE

## 2024-08-27 PROCEDURE — 84443 ASSAY THYROID STIM HORMONE: CPT

## 2024-08-27 PROCEDURE — G0439 PPPS, SUBSEQ VISIT: HCPCS | Performed by: FAMILY MEDICINE

## 2024-08-27 PROCEDURE — 36415 COLL VENOUS BLD VENIPUNCTURE: CPT

## 2024-08-27 PROCEDURE — 1123F ACP DISCUSS/DSCN MKR DOCD: CPT | Performed by: FAMILY MEDICINE

## 2024-08-27 PROCEDURE — 3077F SYST BP >= 140 MM HG: CPT | Performed by: FAMILY MEDICINE

## 2024-08-27 PROCEDURE — 85027 COMPLETE CBC AUTOMATED: CPT

## 2024-08-27 PROCEDURE — 83718 ASSAY OF LIPOPROTEIN: CPT

## 2024-08-27 PROCEDURE — 82465 ASSAY BLD/SERUM CHOLESTEROL: CPT

## 2024-08-27 PROCEDURE — 99214 OFFICE O/P EST MOD 30 MIN: CPT | Performed by: FAMILY MEDICINE

## 2024-08-27 PROCEDURE — 80053 COMPREHEN METABOLIC PANEL: CPT

## 2024-08-27 PROCEDURE — 3078F DIAST BP <80 MM HG: CPT | Performed by: FAMILY MEDICINE

## 2024-08-27 PROCEDURE — 99397 PER PM REEVAL EST PAT 65+ YR: CPT | Performed by: FAMILY MEDICINE

## 2024-08-27 PROCEDURE — G0103 PSA SCREENING: HCPCS

## 2024-08-27 PROCEDURE — 1159F MED LIST DOCD IN RCRD: CPT | Performed by: FAMILY MEDICINE

## 2024-08-27 RX ORDER — ASCORBIC ACID 125 MG
5 TABLET,CHEWABLE ORAL NIGHTLY
Qty: 90 TABLET | Refills: 3 | Status: SHIPPED | OUTPATIENT
Start: 2024-08-27

## 2024-08-27 RX ORDER — NYSTATIN AND TRIAMCINOLONE ACETONIDE 100000; 1 [USP'U]/G; MG/G
OINTMENT TOPICAL
Qty: 60 G | Refills: 1 | Status: SHIPPED | OUTPATIENT
Start: 2024-08-27 | End: 2024-08-28 | Stop reason: CLARIF

## 2024-08-27 ASSESSMENT — ACTIVITIES OF DAILY LIVING (ADL)
DOING_HOUSEWORK: INDEPENDENT
GROCERY_SHOPPING: INDEPENDENT
TAKING_MEDICATION: INDEPENDENT
MANAGING_FINANCES: INDEPENDENT
BATHING: INDEPENDENT
DRESSING: INDEPENDENT

## 2024-08-27 ASSESSMENT — ENCOUNTER SYMPTOMS
PALPITATIONS: 0
BACK PAIN: 0
EYE PAIN: 0
FATIGUE: 0
LOSS OF SENSATION IN FEET: 0
EYE REDNESS: 0
DYSURIA: 0
SHORTNESS OF BREATH: 0
SORE THROAT: 0
DEPRESSION: 0
CHILLS: 0
WOUND: 0
POLYDIPSIA: 0
COUGH: 0
HEADACHES: 0
RHINORRHEA: 0
NECK STIFFNESS: 0
ADENOPATHY: 0
HEMATURIA: 0
FEVER: 0
BLOOD IN STOOL: 0
OCCASIONAL FEELINGS OF UNSTEADINESS: 0
BRUISES/BLEEDS EASILY: 0
WEAKNESS: 0
HALLUCINATIONS: 0
ABDOMINAL PAIN: 0

## 2024-08-27 NOTE — PROGRESS NOTES
"Subjective   Reason for Visit: Jhonny Baron is an 78 y.o. male here for a Medicare Wellness visit. Annual [facility] preventive exam and annual recheck on DM, AFIB.     Past Medical, Surgical, and Family History reviewed and updated in chart.    Reviewed all medications by prescribing practitioner or clinical pharmacist (such as prescriptions, OTCs, herbal therapies and supplements) and documented in the medical record.        Patient Care Team:  Valdo Arango MD as PCP - General (Family Medicine)  Valdo Arango MD as PCP - [employer] PCP     Review of Systems   Constitutional:  Negative for chills, fatigue and fever.   HENT:  Positive for ear pain. Negative for rhinorrhea and sore throat.    Eyes:  Negative for pain and redness.   Respiratory:  Negative for cough and shortness of breath.    Cardiovascular:  Negative for chest pain and palpitations.   Gastrointestinal:  Negative for abdominal pain and blood in stool.   Endocrine: Negative for polydipsia and polyuria.   Genitourinary:  Negative for dysuria and hematuria.   Musculoskeletal:  Negative for back pain and neck stiffness.   Skin:  Negative for rash and wound.   Allergic/Immunologic: Negative for environmental allergies and food allergies.   Neurological:  Negative for weakness and headaches.   Hematological:  Negative for adenopathy. Does not bruise/bleed easily.   Psychiatric/Behavioral:  Negative for hallucinations and suicidal ideas.        Objective   Vitals:  /78 (BP Location: Left arm, Patient Position: Sitting, BP Cuff Size: Adult)   Pulse 56   Temp 36.8 °C (98.2 °F) (Temporal)   Resp 16   Ht 1.905 m (6' 3\")   Wt 77.7 kg (171 lb 3.2 oz)   SpO2 97%   BMI 21.40 kg/m²       Physical Exam  Vitals reviewed.   Constitutional:       General: He is not in acute distress.     Appearance: He is not ill-appearing.   HENT:      Head: Normocephalic and atraumatic.      Right Ear: Tympanic membrane normal.      Left Ear: " Tympanic membrane normal.      Nose: No congestion or rhinorrhea.      Mouth/Throat:      Pharynx: No oropharyngeal exudate or posterior oropharyngeal erythema.   Eyes:      Extraocular Movements: Extraocular movements intact.      Conjunctiva/sclera: Conjunctivae normal.      Pupils: Pupils are equal, round, and reactive to light.   Cardiovascular:      Rate and Rhythm: Normal rate. Rhythm irregular.      Heart sounds: No murmur heard.     No friction rub. No gallop.   Pulmonary:      Effort: Pulmonary effort is normal.      Breath sounds: Normal breath sounds. No wheezing, rhonchi or rales.   Abdominal:      General: There is no distension.      Palpations: Abdomen is soft.      Tenderness: There is no abdominal tenderness. There is no guarding or rebound.   Musculoskeletal:         General: No swelling or deformity.      Cervical back: Normal range of motion and neck supple.      Right lower leg: No edema.      Left lower leg: No edema.   Skin:     Capillary Refill: Capillary refill takes less than 2 seconds.      Coloration: Skin is not jaundiced.      Findings: No rash.   Neurological:      General: No focal deficit present.      Mental Status: He is alert.      Motor: No weakness.   Psychiatric:         Mood and Affect: Mood normal.         Behavior: Behavior normal.       Diffuse mild eczema, erythema to lower eye lids and b/l dry non-pbstructing ear wax.     Assessment/Plan   Problem List Items Addressed This Visit       Atrial fibrillation (Multi) - Primary    Overview     Xarelto for DOAC.   F/U cardio Dr Darnell.         Current Assessment & Plan     rate controlled with metoprolol.            Contact dermatitis and eczema    Overview     Contact dermatitis vs Xerotic eczema -- treated with oral CS 12/2020.   Per path stable with regular use of aveeno.   Using triamcinolone cream/ointment for acute flare-ups.   Continue using DOVE soap -- nothing stronger.    No longer seeing dermatology.   Previously saw  Silvio.          High cholesterol    Current Assessment & Plan     Stable on simvastatin, gemfibrozil.  Continue current meds.          Relevant Orders    Comprehensive metabolic panel    CBC    Type 2 diabetes mellitus without complication, without long-term current use of insulin (Multi)    Overview     Lab Results   Component Value Date    HGBA1C 5.9 (H) 11/22/2023       Metformin started 7/2023 and tolerating well.            Current Assessment & Plan     Weight loss on Metformin starteed 7/2023  Monitor a1c.         Relevant Orders    Hemoglobin A1c    Tsh With Reflex To Free T4 If Abnormal    Lipid Panel Non-Fasting    Hypertension    Current Assessment & Plan     Low BP -- stopped CCB plendil.  Stable, Continue current  ACEi, BB.         Insomnia    Overview       Insomnia -- melatonin 10 mg -- to try taking earlier in evening for max effect.          Relevant Medications    melatonin 5 mg tablet,chewable    Medicare annual wellness visit, subsequent    Overview     8/27/24 [facility] Preventive exam:  performed  8/27/24 SUBSEQUENT Medicare wellness eval performed         Current Assessment & Plan     8/27/24 [facility] Preventive exam: check labs, continue healthy diet and regular exercise.  No further colonoscopies after age 75.  PSA monitored by Urology.     8/27/24 Subsequent Medicare wellness eval performed -- rare cigar, single fall in garage summer 2024 -- no injury.  no depression, no dementia, no loss of adls. moderate alcohol. no opiates. only rare benzo when flying -- no longer needs this.      8/27/24 I spent 15 minutes face-to-face with this individual discussing their cardiovascular risk and behavioral therapies of nutritional choices, exercise, and elimination of habits contributing to risk. We agreed on a plan addressing reduction of their current cardiovascular risk. For patients with risk calculated >10%, aspirin use was discussed and encouraged unless known allergy or increased risk of bleeding  contraindicates use. Patient's 10 year CV risk estimate calculates to: >10 %      Hx Low TASHA on screening test -- no sx of claudication. Normal pulses on exam. Hold off on formal ABIs/arterial doppler testing unless symptoms present.                Dermatitis, unspecified    Overview     Tinea cruris on chronic eczema         Relevant Medications    nystatin-triamcinolone (Mycolog II) ointment    Gait instability    Overview     Single fall summer 2024.   Refer to PT at [facility] 8/2024         Relevant Orders    Referral to Physical Therapy    RESOLVED: Left inguinal hernia    Overview     5/2024 s/p IHR (Brandyn)          Other Visit Diagnoses       Former cigar smoker        Body mass index (BMI) of 21.0 to 21.9 in adult        Special screening for malignant neoplasm of prostate        Relevant Orders    Prostate Specific Antigen

## 2024-08-27 NOTE — ASSESSMENT & PLAN NOTE
8/27/24 [facility] Preventive exam: check labs, continue healthy diet and regular exercise.  No further colonoscopies after age 75.  PSA monitored by Urology.     8/27/24 Subsequent Medicare wellness eval performed -- rare cigar, single fall in garage summer 2024 -- no injury.  no depression, no dementia, no loss of adls. moderate alcohol. no opiates. only rare benzo when flying -- no longer needs this.      8/27/24 I spent 15 minutes face-to-face with this individual discussing their cardiovascular risk and behavioral therapies of nutritional choices, exercise, and elimination of habits contributing to risk. We agreed on a plan addressing reduction of their current cardiovascular risk. For patients with risk calculated >10%, aspirin use was discussed and encouraged unless known allergy or increased risk of bleeding contraindicates use. Patient's 10 year CV risk estimate calculates to: >10 %      Hx Low TASHA on screening test -- no sx of claudication. Normal pulses on exam. Hold off on formal ABIs/arterial doppler testing unless symptoms present.

## 2024-08-27 NOTE — PATIENT INSTRUCTIONS
"1) For mail ear wax -- I recommend using over the counter \"DEBROX\" ear wax drops in evening --  And let the water from your shower rinse the openings of your ears daily.     2) For dry eyes -- use saline eye drops -- over the counter \"RENEW\" or \"REFRESH\" brands are good.  1 drop in each eye up to 3-4 times a day to keep moisturized.   "

## 2024-08-28 DIAGNOSIS — B35.9 TINEA: Primary | ICD-10-CM

## 2024-08-28 LAB
EST. AVERAGE GLUCOSE BLD GHB EST-MCNC: 134 MG/DL
HBA1C MFR BLD: 6.3 %
PSA SERPL-MCNC: <0.1 NG/ML

## 2024-08-28 RX ORDER — CLOTRIMAZOLE AND BETAMETHASONE DIPROPIONATE 10; .64 MG/G; MG/G
1 CREAM TOPICAL 2 TIMES DAILY
Qty: 45 G | Refills: 1 | Status: SHIPPED | OUTPATIENT
Start: 2024-08-28 | End: 2024-12-26

## 2024-09-03 ENCOUNTER — EVALUATION (OUTPATIENT)
Dept: PHYSICAL THERAPY | Facility: CLINIC | Age: 79
End: 2024-09-03
Payer: MEDICARE

## 2024-09-03 DIAGNOSIS — R26.81 GAIT INSTABILITY: ICD-10-CM

## 2024-09-03 PROCEDURE — 97162 PT EVAL MOD COMPLEX 30 MIN: CPT | Mod: GP | Performed by: PHYSICAL THERAPIST

## 2024-09-03 PROCEDURE — 97112 NEUROMUSCULAR REEDUCATION: CPT | Mod: GP | Performed by: PHYSICAL THERAPIST

## 2024-09-03 ASSESSMENT — PATIENT HEALTH QUESTIONNAIRE - PHQ9
1. LITTLE INTEREST OR PLEASURE IN DOING THINGS: NOT AT ALL
2. FEELING DOWN, DEPRESSED OR HOPELESS: NOT AT ALL
SUM OF ALL RESPONSES TO PHQ9 QUESTIONS 1 AND 2: 0

## 2024-09-03 ASSESSMENT — ENCOUNTER SYMPTOMS
DEPRESSION: 0
LOSS OF SENSATION IN FEET: 1
OCCASIONAL FEELINGS OF UNSTEADINESS: 1

## 2024-09-03 ASSESSMENT — PAIN SCALES - GENERAL: PAINLEVEL_OUTOF10: 0 - NO PAIN

## 2024-09-03 ASSESSMENT — PAIN - FUNCTIONAL ASSESSMENT: PAIN_FUNCTIONAL_ASSESSMENT: 0-10

## 2024-09-03 NOTE — PROGRESS NOTES
Physical Therapy  Physical Therapy Orthopedic Evaluation    Patient Name: Jhonny Baron  MRN: [id number]  Today's Date: 9/3/2024  Time Calculation  Start Time: 1450  Stop Time: 1529  Time Calculation (min): 39 min  PT Evaluation Time Entry  PT Evaluation (Moderate) Time Entry: 25  PT Therapeutic Procedures Time Entry  Neuromuscular Re-Education Time Entry: 13       Insurance:  Payor: UNITED [employer] MEDICARE / Plan: UNITED HEALTHCARE MEDICARE / Product Type: *No Product type* /   Number of Treatments Authorized: 1/? (auth)  Certification Period Start Date: 09/03/24  Certification Period End Date: 12/02/24    Current Problem  Problem List Items Addressed This Visit             ICD-10-CM       Symptoms and Signs    Gait instability R26.81    Relevant Orders    Follow Up In Physical Therapy        General:  General  Reason for Referral: Gait and balance deficits  Referred By: LEMUEL Arango  Past Medical History Relevant to Rehab: High BP, neuropathy, hernia repairs, prostate cancer, skin cancer    Precautions:   Precautions  STEADI Fall Risk Score (The score of 4 or more indicates an increased risk of falling): 7  Precautions Comment: moderate fall risk    Medical History Form: Reviewed (scanned into chart)    Subjective:   Subjective   Chief Complaint: Patient reports his doctor wanted him to come to PT to work on balance/walking. Did have one fall ~3 months ago where he lost balance and fell forward in garage. Note sure what happened. Feels very nervous and cautious about walking because he's worried now about falling. Difficulty with yard work - does raking/yard clean up. Very hard to pick things up from the ground. Has stairs in the basement which he can manage with a rail. Notes he's catching his feet sometimes while walking.   Might be getting a walking stick.   Does report some neuropathy in R foot. Hx of LBP but nothing recent.   Does report having increased urgency to get to the bathroom, will notify MD.  "Also reports weight loss recent - MD is aware.       Pain:  Pain Assessment: 0-10  0-10 (Numeric) Pain Score: 0 - No pain    Relevant Information (PMH & Previous Tests/Imaging): none    Prior Level of Function (PLOF)  Patient previously independent with all ADLs  Exercise/Physical Activity: used to walk last fall and play golf   Work/School: retired     Patients Living Environment: Reviewed and no concern    Primary Language: English    Patient's Goal(s) for Therapy: \"more steady on feet\"     Personal factors/comorbidities affecting outcomes: none    Red Flags: Do you have any of the following? Yes  Fever/chills, unexplained weight changes, dizziness/fainting, unexplained change in bowel or bladder functions, unexplained malaise or muscle weakness, night pain/sweats, numbness or tingling    Objective:  Pt presents with rigid trunk / reduced rotation     AROM Lumbar (% of normal)     Flexion 66%     Extension neutral     R lateral flexion 66%     L lateral flexion 66%    Hip MMT    R Hip flex 4+/5     ER 4/5     IR 4+/5     Abd 4/5     Add 5/5    L Hip flex 4/5     ER 4/5     IR 4+/5     Abd 4/5     Add 5/5    Knee MMT    R knee ext 4+/5    R knee flexion 4+/5    L knee ext 4+/5    L knee flexion 4-/5     Balance   SLS (R,L) 2 sec, 3 sec  Semitandem 10 sec each  Tandem (R/L) 12 sec   Tandem (L/R) 18 sec     5TSTS: unable without UE support   TUG - may perform next visit    Outcome Measures:  Other Measures  Activities - Specific Balance Confidence Scale: 85%     EDUCATION: Home exercise program, plan of care, activity modifications, pain management, and injury pathology  Outpatient Education  Individual(s) Educated: Patient  Education Provided: Home Exercise Program, Anatomy, POC  Patient/Caregiver Demonstrated Understanding: yes  Plan of Care Discussed and Agreed Upon: yes  Patient Response to Education: Patient/Caregiver Verbalized Understanding of Information  Education Comment: Access Code: [email or web address]  URL: " [email or web address]  Date: 09/03/2024  Prepared by: Soraya Moreno    Exercises  - Sit to Stand Without Arm Support  - 2 x daily - 7 x weekly - 1-2 sets - 8-10 reps  - Standing March with Counter Support  - 2 x daily - 7 x weekly - 2 sets - 10 reps  - Standing Hip Extension  - 2 x daily - 7 x weekly - 2 sets - 10 reps  - Tandem Stance with Support  - 2 x daily - 7 x weekly - 1 sets - 2 reps - 15-30 sec hold    Treatment Performed:  Balance/Neuromuscular Re-Education  Balance/Neuromuscular Re-Education Activity Performed: Yes  Balance/Neuromuscular Re-Education Activity 1: HEP performed 1x through  Balance/Neuromuscular Re-Education Activity 2: Balance strategies education, walking stick for outdoors    Assessment: Patient is 78 year old who presents to physical therapy with signs and symptoms consistent with gait instability. Patient has decreased ROM, static and dynamic balance and strength deficits limiting functional mobility and ADLs. Pt would benefit from skilled physical therapy in order to address the stated deficits and return to daily tasks with reduced pain and improved function.    Clinical Presentation: Evolving with changing characteristics  Personal Factors: Comorbidities listed above    Plan:  Treatment/Interventions: Electrical stimulation, Gait training, Manual therapy, Neuromuscular re-education, Self care/ home management, Taping techniques, Therapeutic activities, Therapeutic exercises, Education/ Instruction  PT Plan: Skilled PT  PT Frequency: 1 time per week  Duration: 8-10 weeks, reducing frequency as goals are met  Onset Date: 04/03/24  Certification Period Start Date: 09/03/24  Certification Period End Date: 12/02/24  Number of Treatments Authorized: 1/? (auth)  Rehab Potential: Good  Plan of Care Agreement: Patient       Screening  Frequency  Date Last Completed   Spiritual and Cultural Beliefs   Screening  each visit or episode of care 9/3/2024   Falls Risk  Screening  every ambulatory visit [unfilled]   Pain Screening  annually at primary care visit  5/30/2024   Domestic Violence screening  annually at primary care visit 5/30/2024   Elder Abuse Screening  annually at primary care visit 5/30/2024   Depression Screening  annually in the primary care setting 9/3/2024   Suicide Risk Screening  annually in the primary care setting 5/30/2024   Nutrition and Food Insecurity   Screening  at least annually at primary care visit     Key Learner  annually in the primary care setting 9/3/2024   Drug Screen  5/30/2024  1:08 PM   Alcohol Screen  5/30/2024  1:08 PM   Advance Directive  5/30/2024         Goals: Set and discussed today  Active       Gait/balance       STG/LTG       Start:  09/03/24    Expected End:  11/12/24       STG  1) Patient will improve perform sit<>stand from std chair without UE support to perform functional activities at home and in the community in 4 weeks.  2) Patient will be able to ambulate x 150' using walking stick safely for short community ambulation and to reduce fall risk.   3) Patient will be independent with HEP to allow for continued improvement in daily tasks at home and in the community in 3 visits.   LTG  1) Patient will have >/=4+/5 strength in lateral hip musculature to aid in stability with ambulation on varied surfaces in community in 8 weeks  2) Patient will be able to perform proper squatting technique in order to prevent increased pain with daily tasks in 8 weeks.  3) Patient will be able to perform >10 seconds of SLS on even ground in order to allow for safe ambulation and to demonstrate reduced fall risk within the community in 8 weeks.   4) Patient will be able to ambulate x 300' with LRAD or without device for improved ability to navigate community ambulation.   5) Pt will perform 5TSTS without UE support to show improved transitions and strength by discharge.                Plan of care was developed with input and agreement by  the patient      Soraya Moreno, PT

## 2024-09-04 NOTE — PROGRESS NOTES
Subjective      Chief Complaint   Patient presents with    Follow-up            He does have a history atrial fibrillation as well as hypertension.  He is on Xarelto for embolic protection   He is feeling and is active.  He is not complaining of chest discomfort.  NO PND or orthopnea.  The legs are not swelling on him.  He does not complain of palpitations.  He has not felt the afib and no dizziness or lightheadedness  The BP is up today and has been high             Review of Systems   Constitutional: Negative. Negative for chills and fever.   HENT: Negative.     Eyes: Negative.    Respiratory: Negative.  Negative for cough.    Endocrine: Negative.    Skin: Negative.    Musculoskeletal: Negative.  Negative for back pain.   Gastrointestinal: Negative.    Genitourinary: Negative.    Neurological: Negative.    All other systems reviewed and are negative.       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    Atrial fibrillation (Multi) 07/21/2023    Blepharitis 07/21/2023    Cataract 07/21/2023    Chronic kidney disease, stage 3 (Multi) 07/21/2023    Contact dermatitis and eczema 07/21/2023    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 07/21/2023    ED (erectile dysfunction) 07/21/2023    Eyestrain, bilateral 07/21/2023    High cholesterol 07/21/2023    Type 2 diabetes mellitus without complication, without long-term current use of insulin (Multi) 07/21/2023    Hypertension 07/21/2023    Insomnia 07/21/2023    Nuclear sclerosis of both eyes 07/21/2023    Osteoarthritis 07/21/2023    Numbness and tingling  07/21/2023    Pinguecula 07/21/2023    Prostate cancer (Multi) 07/21/2023    PVD (posterior vitreous detachment), both eyes 07/21/2023    Refractive error 07/21/2023    OLIVE (subconjunctival hemorrhage) 07/21/2023    Skin cancer of face 07/21/2023    Vitreous floaters 07/21/2023    Medicare annual wellness visit, subsequent 07/21/2023    Atopic dermatitis, unspecified 03/28/2023    Chronic venous insufficiency 03/28/2023    First degree atrioventricular block 09/13/2023    Hemangioma of skin and subcutaneous tissue 03/28/2023    Melanocytic nevi of trunk 03/28/2023    Neoplasm of uncertain behavior of skin 03/28/2023    Actinic keratosis 03/28/2023    Personal history of other malignant neoplasm of skin 03/28/2023    Dermatitis, unspecified 03/28/2023    Psoriasis 09/13/2023    Scar condition and fibrosis of skin 03/28/2023    Right bundle branch block 09/13/2023    Rosacea, unspecified 03/28/2023    Presbyopia of both eyes 10/25/2023    Myopia of both eyes 10/25/2023    Non-recurrent unilateral inguinal hernia without obstruction or gangrene 04/05/2024    Ataxia 04/16/2024    Gait instability 08/27/2024     Resolved Ambulatory Problems     Diagnosis Date Noted    Left inguinal hernia 03/22/2024     Past Medical History:   Diagnosis Date    A-fib (Multi)     Alcohol abuse, uncomplicated     Anxiety disorder, unspecified     Dermatochalasis of unspecified eye, unspecified eyelid 01/06/2020    Dermatochalasis of unspecified eye, unspecified eyelid 01/06/2020    Dermatochalasis of unspecified eye, unspecified eyelid 01/06/2020    DM type 2 (diabetes mellitus, type 2) (Multi)     HTN (hypertension)     Male erectile dysfunction, unspecified     Other conditions influencing health status     Other specified diseases of liver     Palpitations     Personal history of other diseases of male genital organs     Personal history of other diseases of the circulatory system     Personal history of other endocrine, nutritional and  "metabolic disease     Pinguecula, right eye 01/06/2020    Pinguecula, right eye 01/06/2020    Pinguecula, right eye 01/06/2020    Pure hypercholesterolemia, unspecified 07/22/2022        Visit Vitals  /70   Pulse 63   Wt 77.6 kg (171 lb)   SpO2 98%   BMI 21.37 kg/m²   Smoking Status Former   BSA 2.03 m²        Objective     Constitutional:       Appearance: Healthy appearance.   Eyes:      Pupils: Pupils are equal, round, and reactive to light.   Neck:      Vascular: No JVR. JVD normal.   Pulmonary:      Effort: Pulmonary effort is normal.      Breath sounds: Normal breath sounds.   Cardiovascular:      PMI at left midclavicular line. Normal rate. Irregularly irregular rhythm. Normal S1. Normal S2.       Murmurs: There is a grade 1to 2/6 holosystolic murmur.      No gallop.  No click. No rub.   Pulses:     Intact distal pulses.   Edema:     Peripheral edema absent.   Abdominal:      Palpations: Abdomen is soft.      Tenderness: There is no abdominal tenderness.   Musculoskeletal: Normal range of motion.      Extremities: No clubbing present.Skin:     General: Skin is warm and dry.   Neurological:      General: No focal deficit present.            Lab Review:         Lab Results   Component Value Date    CHOL 120 08/27/2024    CHOL 94 08/15/2023    CHOL 111 07/21/2023     Lab Results   Component Value Date    HDL 37.7 08/27/2024    HDL 27.7 (A) 08/15/2023    HDL 31.5 (A) 07/21/2023     Lab Results   Component Value Date    LDLCALC 63 (L) 01/16/2018     Lab Results   Component Value Date    TRIG 97 08/15/2023    TRIG 155 (H) 07/21/2023    TRIG 93 02/22/2022     No components found for: \"CHOLHDL\"     Assessment/Plan     Atrial fibrillation (Multi)  He is doing well and no angina and no chf. The afib is stable.    Hypertension  The BP is high and will adjust the meds    High cholesterol  Is controlled     "

## 2024-09-05 ENCOUNTER — OFFICE VISIT (OUTPATIENT)
Dept: CARDIOLOGY | Facility: CLINIC | Age: 79
End: 2024-09-05
Payer: MEDICARE

## 2024-09-05 VITALS
DIASTOLIC BLOOD PRESSURE: 70 MMHG | SYSTOLIC BLOOD PRESSURE: 162 MMHG | BODY MASS INDEX: 21.37 KG/M2 | HEART RATE: 63 BPM | WEIGHT: 171 LBS | OXYGEN SATURATION: 98 %

## 2024-09-05 DIAGNOSIS — I10 PRIMARY HYPERTENSION: ICD-10-CM

## 2024-09-05 DIAGNOSIS — E78.00 HIGH CHOLESTEROL: ICD-10-CM

## 2024-09-05 DIAGNOSIS — I48.21 PERMANENT ATRIAL FIBRILLATION (MULTI): Primary | ICD-10-CM

## 2024-09-05 PROCEDURE — 3078F DIAST BP <80 MM HG: CPT | Performed by: INTERNAL MEDICINE

## 2024-09-05 PROCEDURE — 1159F MED LIST DOCD IN RCRD: CPT | Performed by: INTERNAL MEDICINE

## 2024-09-05 PROCEDURE — 1126F AMNT PAIN NOTED NONE PRSNT: CPT | Performed by: INTERNAL MEDICINE

## 2024-09-05 PROCEDURE — 3077F SYST BP >= 140 MM HG: CPT | Performed by: INTERNAL MEDICINE

## 2024-09-05 PROCEDURE — 99213 OFFICE O/P EST LOW 20 MIN: CPT | Performed by: INTERNAL MEDICINE

## 2024-09-05 PROCEDURE — 1036F TOBACCO NON-USER: CPT | Performed by: INTERNAL MEDICINE

## 2024-09-05 PROCEDURE — 1157F ADVNC CARE PLAN IN RCRD: CPT | Performed by: INTERNAL MEDICINE

## 2024-09-05 RX ORDER — LOSARTAN POTASSIUM 100 MG/1
100 TABLET ORAL DAILY
Qty: 90 TABLET | Refills: 3 | Status: SHIPPED | OUTPATIENT
Start: 2024-09-05 | End: 2025-09-05

## 2024-09-05 ASSESSMENT — ENCOUNTER SYMPTOMS
BACK PAIN: 0
CONSTITUTIONAL NEGATIVE: 1
RESPIRATORY NEGATIVE: 1
ENDOCRINE NEGATIVE: 1
FEVER: 0
NEUROLOGICAL NEGATIVE: 1
GASTROINTESTINAL NEGATIVE: 1
MUSCULOSKELETAL NEGATIVE: 1
CHILLS: 0
EYES NEGATIVE: 1
COUGH: 0

## 2024-09-05 ASSESSMENT — PAIN SCALES - GENERAL: PAINLEVEL: 0-NO PAIN

## 2024-09-17 ENCOUNTER — TREATMENT (OUTPATIENT)
Dept: PHYSICAL THERAPY | Facility: CLINIC | Age: 79
End: 2024-09-17
Payer: MEDICARE

## 2024-09-17 DIAGNOSIS — R26.81 GAIT INSTABILITY: ICD-10-CM

## 2024-09-17 PROCEDURE — 97112 NEUROMUSCULAR REEDUCATION: CPT | Mod: GP | Performed by: PHYSICAL THERAPIST

## 2024-09-17 PROCEDURE — 97110 THERAPEUTIC EXERCISES: CPT | Mod: GP | Performed by: PHYSICAL THERAPIST

## 2024-09-17 ASSESSMENT — PAIN SCALES - GENERAL: PAINLEVEL_OUTOF10: 0 - NO PAIN

## 2024-09-17 ASSESSMENT — PAIN - FUNCTIONAL ASSESSMENT: PAIN_FUNCTIONAL_ASSESSMENT: 0-10

## 2024-09-17 NOTE — PROGRESS NOTES
Physical Therapy Treatment    Patient Name: Jhonny Baron  MRN: [id number]  Today's Date: 9/17/2024    Current Problem  Problem List Items Addressed This Visit             ICD-10-CM    Gait instability R26.81       Insurance:  Payor: [employer] MEDICARE / Plan: UNITED HEALTHCARE MEDICARE / Product Type: *No Product type* /   Number of Treatments Authorized: 2/20 (auth)  Certification Period Start Date: 09/03/24  Certification Period End Date: 12/02/24    Subjective : States he was able to perform most of his HEP. Struggled with sit to stand. Denies pain.   General  Reason for Referral: Gait and balance deficits  Referred By: LEMUEL Arango  Past Medical History Relevant to Rehab: High BP, neuropathy, hernia repairs, prostate cancer, skin cancer    Performing HEP?: Yes/partially.    Precautions  Precautions  Precautions Comment: moderate fall risk  Pain  Pain Assessment: 0-10  0-10 (Numeric) Pain Score: 0 - No pain    Objective   Treatments:    Therapeutic Exercise  Therapeutic Exercise Activity 1: SciFit: L2 - 5 minutes  Therapeutic Exercise Activity 2: Calf stretch: slant board x 1 min  Therapeutic Exercise Activity 3: Heel/toe raise: x 10  Therapeutic Exercise Activity 4: Standing H/S curl: x 10 - B  Therapeutic Exercise Activity 5: Standing marches: x 10  Therapeutic Exercise Activity 6: Standing hip extension: x 10  Therapeutic Exercise Activity 7: Sit to stand using foam pad: 2 x 5    Balance/Neuromuscular Re-Education  Balance/Neuromuscular Re-Education Activity 1: Tandem standing w/ light support: 15 hold x 2 - B  Balance/Neuromuscular Re-Education Activity 2: AirEx Beam: static standing x 1 min (needs // bar support frequently.)  Balance/Neuromuscular Re-Education Activity 3: AirEx Beam: side stepping x 1 min (needs // bar support frequently.)  Balance/Neuromuscular Re-Education Activity 4: AirEx Beam: head nods/ rotation x 5 ea      Assessment:  PT Assessment  Assessment Comment: Tolerated session well.  Recommended using kitchen chair with cushion for sit to stand exercises. Challenged with all proprioceptive exercises. Will benefit from continued therapy towards balance and exercise goals.  Fatigued with session - required short rest periods.     Plan:  OP PT Plan  Treatment/Interventions: Electrical stimulation, Gait training, Manual therapy, Neuromuscular re-education, Self care/ home management, Taping techniques, Therapeutic activities, Therapeutic exercises, Education/ Instruction  PT Plan: Skilled PT  PT Frequency: 1 time per week  Duration: 8-10 weeks, reducing frequency as goals are met  Onset Date: 04/03/24  Certification Period Start Date: 09/03/24  Certification Period End Date: 12/02/24  Number of Treatments Authorized: 2/20 (auth)  Rehab Potential: Good  Plan of Care Agreement: Patient    Goals:  Active       Gait/balance       STG/LTG       Start:  09/03/24    Expected End:  11/12/24       STG  1) Patient will improve perform sit<>stand from std chair without UE support to perform functional activities at home and in the community in 4 weeks.  2) Patient will be able to ambulate x 150' using walking stick safely for short community ambulation and to reduce fall risk.   3) Patient will be independent with HEP to allow for continued improvement in daily tasks at home and in the community in 3 visits.   LTG  1) Patient will have >/=4+/5 strength in lateral hip musculature to aid in stability with ambulation on varied surfaces in community in 8 weeks  2) Patient will be able to perform proper squatting technique in order to prevent increased pain with daily tasks in 8 weeks.  3) Patient will be able to perform >10 seconds of SLS on even ground in order to allow for safe ambulation and to demonstrate reduced fall risk within the community in 8 weeks.   4) Patient will be able to ambulate x 300' with LRAD or without device for improved ability to navigate community ambulation.   5) Pt will perform 5TSTS  without UE support to show improved transitions and strength by discharge.                 Time Calculation  Start Time: 1615  Stop Time: 1700  Time Calculation (min): 45 min  PT Therapeutic Procedures Time Entry  Neuromuscular Re-Education Time Entry: 15  Therapeutic Exercise Time Entry: 25,

## 2024-09-24 ENCOUNTER — TREATMENT (OUTPATIENT)
Dept: PHYSICAL THERAPY | Facility: CLINIC | Age: 79
End: 2024-09-24
Payer: MEDICARE

## 2024-09-24 DIAGNOSIS — R26.81 GAIT INSTABILITY: ICD-10-CM

## 2024-09-24 PROCEDURE — 97110 THERAPEUTIC EXERCISES: CPT | Mod: GP | Performed by: PHYSICAL THERAPIST

## 2024-09-24 ASSESSMENT — PAIN SCALES - GENERAL: PAINLEVEL_OUTOF10: 0 - NO PAIN

## 2024-09-24 ASSESSMENT — PAIN - FUNCTIONAL ASSESSMENT: PAIN_FUNCTIONAL_ASSESSMENT: 0-10

## 2024-09-24 NOTE — PROGRESS NOTES
Physical Therapy Treatment    Patient Name: Jhonny Baron  MRN: [id number]  Today's Date: 9/24/2024    Current Problem  Problem List Items Addressed This Visit             ICD-10-CM    Gait instability R26.81       Insurance:  Payor: UNITED [employer] MEDICARE / Plan: UNITED HEALTHCARE MEDICARE / Product Type: *No Product type* /   Number of Treatments Authorized: 3/20 (auth)  Certification Period Start Date: 09/03/24  Certification Period End Date: 12/02/24    Subjective   General  Reason for Referral: Gait and balance deficits  Referred By: LEMUEL Arango  Past Medical History Relevant to Rehab: High BP, neuropathy, hernia repairs, prostate cancer, skin cancer    Performing HEP?: Yes    Precautions  Precautions  Precautions Comment: moderate fall risk  Pain  Pain Assessment: 0-10  0-10 (Numeric) Pain Score: 0 - No pain    Objective   Treatments:    Therapeutic Exercise  Therapeutic Exercise Activity 1: SciFit: L2.5 - 6 minutes  Therapeutic Exercise Activity 2: Calf stretch: slant board x 1 min  Therapeutic Exercise Activity 3: Heel/toe raise: x 20  Therapeutic Exercise Activity 4: Standing H/S curl: x 10 - B  Therapeutic Exercise Activity 5: Sit to stand w/foam pad: 2 x 5  Therapeutic Exercise Activity 6: Standing hip extension: x 10  Therapeutic Exercise Activity 7: Rocking x 1 min each  Therapeutic Exercise Activity 8: Dynamics: marches, [place], Butt kicks, Side steps    Balance/Neuromuscular Re-Education  Balance/Neuromuscular Re-Education Activity 1: AirEx Beam: Forward walk x 1 min  Balance/Neuromuscular Re-Education Activity 2: AirEx Beam: side steps x 1 min    OP EDUCATION:  Outpatient Education  Education Comment: Access Code: [email or web address]  URL: [email or web address]  Date: 09/24/2024  Prepared by: Medardo Florentino    Exercises  - Staggered Stance Forward Backward Weight Shift with Unilateral Counter Support  - 1 x daily - 7 x weekly - 2 sets - 10 reps  - Side Stepping with Unilateral  Counter Support  - 1 x daily - 7 x weekly - 2 sets - 10 reps  - Bird Dog on Counter  - 1 x daily - 7 x weekly - 2 sets - 10 reps    Assessment:  PT Assessment  Assessment Comment: Tolerated session well. Advanced balance program with weight shift rocking, side steps and bird-dog at counter. HEP issued. Will benefit from continued therapy to address dynamic balance and proprioceptive feedback.    Plan:  OP PT Plan  Treatment/Interventions: Electrical stimulation, Gait training, Manual therapy, Neuromuscular re-education, Self care/ home management, Taping techniques, Therapeutic activities, Therapeutic exercises, Education/ Instruction  PT Plan: Skilled PT  PT Frequency: 1 time per week  Duration: 8-10 weeks, reducing frequency as goals are met  Onset Date: 04/03/24  Certification Period Start Date: 09/03/24  Certification Period End Date: 12/02/24  Number of Treatments Authorized: 3/20 (auth)  Rehab Potential: Good  Plan of Care Agreement: Patient    Goals:  Active       Gait/balance       STG/LTG       Start:  09/03/24    Expected End:  11/12/24       STG  1) Patient will improve perform sit<>stand from std chair without UE support to perform functional activities at home and in the community in 4 weeks.  2) Patient will be able to ambulate x 150' using walking stick safely for short community ambulation and to reduce fall risk.   3) Patient will be independent with HEP to allow for continued improvement in daily tasks at home and in the community in 3 visits.   LTG  1) Patient will have >/=4+/5 strength in lateral hip musculature to aid in stability with ambulation on varied surfaces in community in 8 weeks  2) Patient will be able to perform proper squatting technique in order to prevent increased pain with daily tasks in 8 weeks.  3) Patient will be able to perform >10 seconds of SLS on even ground in order to allow for safe ambulation and to demonstrate reduced fall risk within the community in 8 weeks.   4)  Patient will be able to ambulate x 300' with LRAD or without device for improved ability to navigate community ambulation.   5) Pt will perform 5TSTS without UE support to show improved transitions and strength by discharge.                 Time Calculation  Start Time: 1445  Stop Time: 1530  Time Calculation (min): 45 min  PT Therapeutic Procedures Time Entry  Neuromuscular Re-Education Time Entry: 4  Therapeutic Exercise Time Entry: 38,

## 2024-09-30 ENCOUNTER — APPOINTMENT (OUTPATIENT)
Dept: PHYSICAL THERAPY | Facility: CLINIC | Age: 79
End: 2024-09-30
Payer: MEDICARE

## 2024-10-01 ENCOUNTER — APPOINTMENT (OUTPATIENT)
Dept: OPHTHALMOLOGY | Facility: CLINIC | Age: 79
End: 2024-10-01
Payer: MEDICARE

## 2024-10-01 DIAGNOSIS — H52.13 MYOPIA OF BOTH EYES: Primary | ICD-10-CM

## 2024-10-01 DIAGNOSIS — H40.003 GLAUCOMA SUSPECT OF BOTH EYES: ICD-10-CM

## 2024-10-01 DIAGNOSIS — H35.363 DEGENERATIVE RETINAL DRUSEN OF BOTH EYES: ICD-10-CM

## 2024-10-01 DIAGNOSIS — H52.4 PRESBYOPIA OF BOTH EYES: ICD-10-CM

## 2024-10-01 PROCEDURE — 92014 COMPRE OPH EXAM EST PT 1/>: CPT | Performed by: OPTOMETRIST

## 2024-10-01 PROCEDURE — 92015 DETERMINE REFRACTIVE STATE: CPT | Performed by: OPTOMETRIST

## 2024-10-01 PROCEDURE — 92134 CPTRZ OPH DX IMG PST SGM RTA: CPT | Performed by: OPTOMETRIST

## 2024-10-01 ASSESSMENT — TONOMETRY
OS_IOP_MMHG: 17
IOP_METHOD: GOLDMANN APPLANATION
OD_IOP_MMHG: 16

## 2024-10-01 ASSESSMENT — VISUAL ACUITY
METHOD: SNELLEN - LINEAR
OD_CC: 20/30
OD_BAT_MED: 20/30
CORRECTION_TYPE: GLASSES
OS_CC: 20/25
OD_CC+: +1
OS_BAT_MED: 20/25
OS_CC+: -2

## 2024-10-01 ASSESSMENT — REFRACTION_MANIFEST
OD_SPHERE: -1.00
OD_ADD: +2.75
OS_ADD: +2.75
OS_CYLINDER: -2.25
OD_AXIS: 110
OS_SPHERE: -1.25
OS_AXIS: 077
OD_CYLINDER: -2.50

## 2024-10-01 ASSESSMENT — CONF VISUAL FIELD
OS_INFERIOR_NASAL_RESTRICTION: 0
OS_SUPERIOR_TEMPORAL_RESTRICTION: 0
OD_INFERIOR_TEMPORAL_RESTRICTION: 0
OS_INFERIOR_TEMPORAL_RESTRICTION: 0
OS_NORMAL: 1
OD_INFERIOR_NASAL_RESTRICTION: 0
OD_SUPERIOR_TEMPORAL_RESTRICTION: 0
OS_SUPERIOR_NASAL_RESTRICTION: 0
OD_SUPERIOR_NASAL_RESTRICTION: 0
OD_NORMAL: 1

## 2024-10-01 ASSESSMENT — ENCOUNTER SYMPTOMS
ENDOCRINE NEGATIVE: 0
EYES NEGATIVE: 1
RESPIRATORY NEGATIVE: 0
CARDIOVASCULAR NEGATIVE: 0
HEMATOLOGIC/LYMPHATIC NEGATIVE: 0
NEUROLOGICAL NEGATIVE: 0
GASTROINTESTINAL NEGATIVE: 0
MUSCULOSKELETAL NEGATIVE: 0
PSYCHIATRIC NEGATIVE: 0
ALLERGIC/IMMUNOLOGIC NEGATIVE: 0
CONSTITUTIONAL NEGATIVE: 0

## 2024-10-01 ASSESSMENT — EXTERNAL EXAM - LEFT EYE: OS_EXAM: NORMAL

## 2024-10-01 ASSESSMENT — CUP TO DISC RATIO
OS_RATIO: 0.5
OD_RATIO: 0.4

## 2024-10-01 ASSESSMENT — REFRACTION_WEARINGRX
OS_AXIS: 077
OD_ADD: +2.75
OS_ADD: +2.75
OD_CYLINDER: -2.50
SPECS_TYPE: BIFOCAL
OS_CYLINDER: -3.00
OD_AXIS: 109
OD_SPHERE: -1.75
OS_SPHERE: -1.50

## 2024-10-01 ASSESSMENT — EXTERNAL EXAM - RIGHT EYE: OD_EXAM: NORMAL

## 2024-10-01 NOTE — PROGRESS NOTES
Minor cataracts will mild PSC Va 20/25 OD/OS was 20/25 20/20 OD/OS  BAT 20/30 20/25 OD/OS    Minor drusen stage AMD OS>OD.  No need for AREDS 2 at this stage does not neet AREDS criteria.    Optical coherence tomography of the macula revealed:   OD: Normal foveal contour, photoreceptor, retinal pigment epithelium, IS/OS junction, central field 277 was 280 was 278 micron.  Vitreous hyaloid base not visualized.  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  OS:  Normal foveal contour, photoreceptor, retinal pigment epithelium, IS/OS junction, central field 285 was 286 was 286 micron.  Vitreous hyaloid base not visualized.  Findings are normal.  One single drusen observed temporal to fovea.  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    Glaucoma suspect/anomalous disc. Optical coherence tomography of the retinal nerve fiber layer (RNFL) revealed:   OD: Reduced inferior thickness with an average RNFL thickness of 75 micron.  OS: Reduced inferior thicknesswith an average RNFL thickness of 74 micron.    Discussed can DC AREDS 2 vits.      A spectacle prescription was dispensed to be used as needed.    The patient was asked to return to our clinic in one year or sooner if ocular or vision changes occur.    1 year manifest refraction (MR) VAT DFE and OCT macula and RNFL.

## 2024-10-07 ENCOUNTER — TREATMENT (OUTPATIENT)
Dept: PHYSICAL THERAPY | Facility: CLINIC | Age: 79
End: 2024-10-07
Payer: MEDICARE

## 2024-10-07 DIAGNOSIS — R26.81 GAIT INSTABILITY: ICD-10-CM

## 2024-10-07 PROCEDURE — 97112 NEUROMUSCULAR REEDUCATION: CPT | Mod: GP | Performed by: PHYSICAL THERAPIST

## 2024-10-07 PROCEDURE — 97110 THERAPEUTIC EXERCISES: CPT | Mod: GP | Performed by: PHYSICAL THERAPIST

## 2024-10-07 ASSESSMENT — PAIN - FUNCTIONAL ASSESSMENT: PAIN_FUNCTIONAL_ASSESSMENT: 0-10

## 2024-10-07 ASSESSMENT — PAIN SCALES - GENERAL: PAINLEVEL_OUTOF10: 0 - NO PAIN

## 2024-10-07 NOTE — PROGRESS NOTES
Physical Therapy Treatment    Patient Name: Jhonny Baron  MRN: [id number]  Today's Date: 10/7/2024  Time Calculation  Start Time: 1031  Stop Time: 1114  Time Calculation (min): 43 min  PT Therapeutic Procedures Time Entry  Neuromuscular Re-Education Time Entry: 27  Therapeutic Exercise Time Entry: 15       Current Problem  Problem List Items Addressed This Visit             ICD-10-CM       Symptoms and Signs    Gait instability R26.81       Insurance:  Number of Treatments Authorized: 4/20 (auth)  Certification Period Start Date: 09/03/24  Certification Period End Date: 12/02/24    Subjective   General  Reason for Referral: Gait and balance deficits  Referred By: LEMUEL Arango  Past Medical History Relevant to Rehab: High BP, neuropathy, hernia repairs, prostate cancer, skin cancer  General Comment: Pt reports he hasn't had time to do many exercises. Knows he needs to do more. No new complaints. Feels unsteady at times, particularly going down stairs.    Performing HEP?: No    Precautions  Precautions  STEADI Fall Risk Score (The score of 4 or more indicates an increased risk of falling): 7  Precautions Comment: moderate fall risk  Pain  Pain Assessment: 0-10  0-10 (Numeric) Pain Score: 0 - No pain    Objective    R SLS 2 sec  L SLS 2 sec       Treatments:  Therapeutic Exercise  Therapeutic Exercise Activity 1: SciFit: L3 - 6 minutes - seat 13  Therapeutic Exercise Activity 2: Heel/toe raises x 1 min  Therapeutic Exercise Activity 3: Standing R/L alt march with UE support x 1 min  Therapeutic Exercise Activity 4: Standing R,L hip extension x 30 sec each  Therapeutic Exercise Activity 5: Gastroc stretch slantboard x 1 min    Balance/Neuromuscular Re-Education  Balance/Neuromuscular Re-Education Activity Performed:  (CGA when without UE support)  Balance/Neuromuscular Re-Education Activity 1: R/L side stepping without UE support x 1 min  Balance/Neuromuscular Re-Education Activity 2: R/L alt bird dog x 1 min -  "challenged with sequencing  Balance/Neuromuscular Re-Education Activity 3: R,L SLS x 30 sec each - difficult  Balance/Neuromuscular Re-Education Activity 4: R/L tandem, L/R tandem x 30 sec each  Balance/Neuromuscular Re-Education Activity 5: R/L mod tandem, L/R mod tandem x 30 sec each  Balance/Neuromuscular Re-Education Activity 6: B narrow MARIANNA airex x 1 min, EC 15\" x 1 min  Balance/Neuromuscular Re-Education Activity 7: R/L alt fwd step ups airex x 1 min with light UE support prn  Balance/Neuromuscular Re-Education Activity 8: R/L lateral step up/over airex with light UE support prn x 1 min  Balance/Neuromuscular Re-Education Activity 9: R/L alt LE tap with 4\" cone x 1 min (light UE support)  Balance/Neuromuscular Re-Education Activity 10: Ambulating ' x 8 with head nods, head turns, counting up/down by 3s, 4s, 7s    OP EDUCATION:  Outpatient Education  Education Comment: Reviewed current HEP    Assessment:  PT Assessment  Assessment Comment: Pt showing good tolerance to exercise program without breaks needed. Challenged with unsupported balance exercises. Difficulty with dual tasking during ambulation but without loss of balance. Will continue to need balance training to reduce fall risk.    Plan:  OP PT Plan  Treatment/Interventions: Electrical stimulation, Gait training, Manual therapy, Neuromuscular re-education, Self care/ home management, Taping techniques, Therapeutic activities, Therapeutic exercises, Education/ Instruction  PT Plan: Skilled PT (gait/balance exercises, dual tasking)  PT Frequency: 1 time per week  Duration: 8-10 weeks, reducing frequency as goals are met  Onset Date: 04/03/24  Certification Period Start Date: 09/03/24  Certification Period End Date: 12/02/24  Number of Treatments Authorized: 4/20 (auth)  Rehab Potential: Good  Plan of Care Agreement: Patient    Goals:  Active       Gait/balance       STG/LTG       Start:  09/03/24    Expected End:  11/12/24       STG  1) Patient will " improve perform sit<>stand from std chair without UE support to perform functional activities at home and in the community in 4 weeks.  2) Patient will be able to ambulate x 150' using walking stick safely for short community ambulation and to reduce fall risk.   3) Patient will be independent with HEP to allow for continued improvement in daily tasks at home and in the community in 3 visits.   LTG  1) Patient will have >/=4+/5 strength in lateral hip musculature to aid in stability with ambulation on varied surfaces in community in 8 weeks  2) Patient will be able to perform proper squatting technique in order to prevent increased pain with daily tasks in 8 weeks.  3) Patient will be able to perform >10 seconds of SLS on even ground in order to allow for safe ambulation and to demonstrate reduced fall risk within the community in 8 weeks.   4) Patient will be able to ambulate x 300' with LRAD or without device for improved ability to navigate community ambulation.   5) Pt will perform 5TSTS without UE support to show improved transitions and strength by discharge.                 Soraya Moreno, PT

## 2024-10-08 ENCOUNTER — APPOINTMENT (OUTPATIENT)
Dept: DERMATOLOGY | Facility: CLINIC | Age: 79
End: 2024-10-08
Payer: MEDICARE

## 2024-10-08 ENCOUNTER — OFFICE VISIT (OUTPATIENT)
Dept: DERMATOLOGY | Facility: CLINIC | Age: 79
End: 2024-10-08
Payer: MEDICARE

## 2024-10-08 DIAGNOSIS — L82.1 SEBORRHEIC KERATOSIS: ICD-10-CM

## 2024-10-08 DIAGNOSIS — D22.9 BENIGN NEVUS: ICD-10-CM

## 2024-10-08 DIAGNOSIS — Z85.828 HISTORY OF NONMELANOMA SKIN CANCER: ICD-10-CM

## 2024-10-08 DIAGNOSIS — L81.4 LENTIGO: ICD-10-CM

## 2024-10-08 DIAGNOSIS — D18.01 ANGIOMA OF SKIN: ICD-10-CM

## 2024-10-08 DIAGNOSIS — L57.0 ACTINIC KERATOSIS: Primary | ICD-10-CM

## 2024-10-08 DIAGNOSIS — L90.5 SCAR CONDITIONS AND FIBROSIS OF SKIN: ICD-10-CM

## 2024-10-08 DIAGNOSIS — D48.5 NEOPLASM OF UNCERTAIN BEHAVIOR OF SKIN: ICD-10-CM

## 2024-10-08 DIAGNOSIS — L30.9 DERMATITIS: ICD-10-CM

## 2024-10-08 DIAGNOSIS — L57.9 SKIN CHANGES DUE TO CHRONIC EXPOSURE TO NONIONIZING RADIATION: ICD-10-CM

## 2024-10-08 PROCEDURE — 1036F TOBACCO NON-USER: CPT | Performed by: NURSE PRACTITIONER

## 2024-10-08 PROCEDURE — 1159F MED LIST DOCD IN RCRD: CPT | Performed by: NURSE PRACTITIONER

## 2024-10-08 PROCEDURE — 11102 TANGNTL BX SKIN SINGLE LES: CPT | Performed by: NURSE PRACTITIONER

## 2024-10-08 PROCEDURE — 99213 OFFICE O/P EST LOW 20 MIN: CPT | Performed by: NURSE PRACTITIONER

## 2024-10-08 PROCEDURE — 1157F ADVNC CARE PLAN IN RCRD: CPT | Performed by: NURSE PRACTITIONER

## 2024-10-08 PROCEDURE — 11103 TANGNTL BX SKIN EA SEP/ADDL: CPT | Performed by: NURSE PRACTITIONER

## 2024-10-08 PROCEDURE — 1160F RVW MEDS BY RX/DR IN RCRD: CPT | Performed by: NURSE PRACTITIONER

## 2024-10-08 PROCEDURE — 69100 BIOPSY OF EXTERNAL EAR: CPT | Performed by: NURSE PRACTITIONER

## 2024-10-08 RX ORDER — CLOBETASOL PROPIONATE 0.5 MG/ML
SOLUTION TOPICAL
Qty: 50 ML | Refills: 2 | Status: SHIPPED | OUTPATIENT
Start: 2024-10-08

## 2024-10-08 RX ORDER — HYDROCORTISONE 25 MG/G
CREAM TOPICAL
Qty: 60 G | Refills: 1 | Status: SHIPPED | OUTPATIENT
Start: 2024-10-08

## 2024-10-08 NOTE — PATIENT INSTRUCTIONS

## 2024-10-08 NOTE — PROGRESS NOTES
Subjective     Jhonny Baron is a 78 y.o. male who presents for the following: Skin Check.     Review of Systems:  No other skin or systemic complaints other than what is documented elsewhere in the note.    The following portions of the chart were reviewed this encounter and updated as appropriate:   Tobacco  Allergies  Meds  Problems  Med Hx  Surg Hx         Skin Cancer History  No skin cancer on file.      Specialty Problems          Dermatology Problems    Actinic keratosis    Atopic dermatitis, unspecified    Dermatitis, unspecified     Tinea cruris on chronic eczema         Hemangioma of skin and subcutaneous tissue    Melanocytic nevi of trunk    Neoplasm of uncertain behavior of skin    Personal history of other malignant neoplasm of skin    Rosacea, unspecified    Scar condition and fibrosis of skin    Contact dermatitis and eczema     Contact dermatitis vs Xerotic eczema -- treated with oral CS 12/2020.   Per path stable with regular use of aveeno.   Using triamcinolone cream/ointment for acute flare-ups.   Continue using DOVE soap -- nothing stronger.    No longer seeing dermatology.   Previously saw Silvio.          Skin cancer of face    Psoriasis        Objective   Well appearing patient in no apparent distress; mood and affect are within normal limits.    A focused skin examination was performed waist up. All findings within normal limits unless otherwise noted below.    Assessment/Plan   1. Actinic keratosis  Mid Parietal Scalp  Pink scar    Bx site on scalp from April, no reoccurrence noted. Exam challenging today to identify AK due to moderate dermatitis noted on scalp and face area. Will reassess in 6-8 weeks at follow up.     2. Neoplasm of uncertain behavior of skin (3)  Left Buccal Cheek  8 mm red crusty thin papule              Lesion biopsy  Type of biopsy: tangential    Informed consent: discussed and consent obtained    Timeout: patient name, date of birth, surgical site, and  procedure verified    Procedure prep:  Patient was prepped and draped  Anesthesia: the lesion was anesthetized in a standard fashion    Anesthetic:  1% lidocaine plain local infiltration  Instrument used: DermaBlade    Hemostasis achieved with: electrodesiccation    Outcome: patient tolerated procedure well    Post-procedure details: wound care instructions given    Additional details:  Cleaned area with isopropyl alcohol prior to anesthesia or biopsy. Applied thin layer of vaseline and covered with bandaid after procedure      Staff Communication: Dermatology Local Anesthesia: 1 % Plain Lidocaine - Amount: 0.5 ml per lesion.    Specimen 1 - Dermatopathology- DERM LAB  Differential Diagnosis: NMSC  Check Margins Yes/No?:    Comments:    Dermpath Lab: Routine Histopathology (formalin-fixed tissue)    Left Postauricular Sulcus  4 mm pearly ulcerated papule              Lesion biopsy  Type of biopsy: tangential    Informed consent: discussed and consent obtained    Timeout: patient name, date of birth, surgical site, and procedure verified    Procedure prep:  Patient was prepped and draped  Anesthesia: the lesion was anesthetized in a standard fashion    Anesthetic:  1% lidocaine plain local infiltration  Instrument used: DermaBlade    Hemostasis achieved with: electrodesiccation    Outcome: patient tolerated procedure well    Post-procedure details: wound care instructions given    Additional details:  Cleaned area with isopropyl alcohol prior to anesthesia or biopsy. Applied thin layer of vaseline and covered with bandaid after procedure      Staff Communication: Dermatology Local Anesthesia: 1 % Plain Lidocaine - Amount: 0.5 ml per lesion.    Specimen 2 - Dermatopathology- DERM LAB  Differential Diagnosis: NMSC  Check Margins Yes/No?:    Comments:    Dermpath Lab: Routine Histopathology (formalin-fixed tissue)    Left Forearm - Posterior  12 mm red crusty papule              Lesion biopsy  Type of biopsy: tangential     Informed consent: discussed and consent obtained    Timeout: patient name, date of birth, surgical site, and procedure verified    Procedure prep:  Patient was prepped and draped  Anesthesia: the lesion was anesthetized in a standard fashion    Anesthetic:  1% lidocaine plain local infiltration  Instrument used: DermaBlade    Hemostasis achieved with: electrodesiccation    Outcome: patient tolerated procedure well    Post-procedure details: wound care instructions given    Additional details:  Cleaned area with isopropyl alcohol prior to anesthesia or biopsy. Applied thin layer of vaseline and covered with bandaid after procedure      Staff Communication: Dermatology Local Anesthesia: 1 % Plain Lidocaine - Amount: 0.5 ml per lesion.    Specimen 3 - Dermatopathology- DERM LAB  Differential Diagnosis: NMSC  Check Margins Yes/No?:    Comments:    Dermpath Lab: Routine Histopathology (formalin-fixed tissue)    NUB  - Given uncertainty in clinical diagnosis, shave biopsy is recommended in clinic today.  - The patient expressed understanding, is in agreement with this plan, and wishes to proceed with biopsy.  - Oral and written wound care instructions provided.  - Advised patient that the office will call within 2 weeks to discuss biopsy results.      Related Procedures  Follow Up In Dermatology - Established Patient  Follow Up In Dermatology - Established Patient    3. Angioma of skin  Scattered cherry-red papule(s).    A cherry hemangioma is a small macule (small, flat, smooth area) or papule (small, solid bump) formed from an overgrowth of tiny blood vessels in the skin. Cherry hemangiomas are characteristically red or purplish in color. They often first appear in middle adulthood and usually increase in number with age. Cherry hemangiomas are noncancerous (benign) and are common in adults.    The present appearance of the lesion is not worrisome but it should continue to be observed and testing/treatment may be  warranted if change occurs.    Related Procedures  Follow Up In Dermatology - Established Patient  Follow Up In Dermatology - Established Patient    4. Benign nevus  Scattered, uniform and benign-appearing, regular brown melanocytic papules and macules.    The present appearance of the lesion is not worrisome but it should continue to be observed and testing/treatment may be warranted if change occurs.    Related Procedures  Follow Up In Dermatology - Established Patient  Follow Up In Dermatology - Established Patient    5. Seborrheic keratosis  Stuck on verrucous, tan-brown papules and plaques.      Seborrheic keratoses are common noncancerous (benign) growths of unknown cause seen in adults due to a thickening of an area of the top skin layer. Seborrheic keratoses may appear as if they are stuck on to the skin. They have distinct borders, and they may appear as papules (small, solid bumps) or plaques (solid, raised patches that are bigger than a thumbnail). They may be the same color as your skin, or they may be pink, light brown, darker brown, or very dark brown, or sometimes may appear black.    There is no way to prevent new seborrheic keratoses from forming. Seborrheic keratoses can be removed, but removal is considered a cosmetic issue and is usually not covered by insurance.    PLAN  No treatment is needed unless there is irritation from clothing, such as itching or bleeding.  2.   Some lotions containing alpha hydroxy acids, salicylic acid, or urea may make the areas feel smoother with regular use but will not eliminate them.    Related Procedures  Follow Up In Dermatology - Established Patient  Follow Up In Dermatology - Established Patient    6. Lentigo  Scattered tan macules in sun-exposed areas.    A solar lentigo (plural, solar lentigines), also known as a sun-induced freckle or senile lentigo, is a dark (hyperpigmented) lesion caused by natural or artificial ultraviolet (UV) light. Solar lentigines  may be single or multiple. This type of lentigo is different from a simple lentigo (lentigo simplex) because it is caused by exposure to UV light. Solar lentigines are benign, but they do indicate excessive sun exposure, a risk factor for the development of skin cancer.    To prevent solar lentigines, avoid exposure to sunlight in midday (10 AM to 3 PM), wear sun-protective clothing (tightly woven clothes and hats), and apply sunscreen (SPF 30 UVA and UVB block).    The present appearance of the lesion is not worrisome but it should continue to be observed and testing/treatment may be warranted if change occurs.    Related Procedures  Follow Up In Dermatology - Established Patient  Follow Up In Dermatology - Established Patient    7. Scar conditions and fibrosis of skin  Well healed scar at the site(s) of prior treatment with no evidence of recurrence.          The scar is clear, there is no evidence of recurrence.  The present appearance of the scar is not worrisome but it should continue to be observed and testing/treatment may be warranted if change occurs.      Related Procedures  Follow Up In Dermatology - Established Patient  Follow Up In Dermatology - Established Patient    8. History of nonmelanoma skin cancer    ABCDEs of melanoma and atypical moles were discussed with the patient.    Patient was instructed to perform monthly self skin examination.  We recommended that the patient have regular full skin exams given an increased risk of subsequent skin cancers.    The patient was instructed to use sun protective behaviors including use of broad spectrum sunscreens and sun protective clothing to reduce risk of skin cancers.    Warning signs of non-melanoma skin cancer discussed.    Related Procedures  Follow Up In Dermatology - Established Patient  Follow Up In Dermatology - Established Patient    9. Skin changes due to chronic exposure to nonionizing radiation  Actinic changes in the form of freckles,  lentigines and hyper/hypopigmentation     ABCDEs of melanoma and atypical moles were discussed with the patient.    Patient was instructed to perform monthly self skin examination.  We recommended that the patient have regular full skin exams given an increased risk of subsequent skin cancers.    The patient was instructed to use sun protective behaviors including use of broad spectrum sunscreens and sun protective clothing to reduce risk of skin cancers.    Warning signs of non-melanoma skin cancer discussed.    Related Procedures  Follow Up In Dermatology - Established Patient  Follow Up In Dermatology - Established Patient    10. Dermatitis  Scalp, Face, AC fossa/elbow area  Involving the scalp, left ear, beard area (including neck area) and AC fossa are red scaly flaky patches, scalp with mixture of flaky and semi pityriasis amiantacea scale.     Patient reports putting on moisturizing creams but not helping. Not sure if he has any hydrocortisone cream at home but does not think he has used in a while. Will start back HTC 2.5% cream to the face and ear areas BID for 2 weeks then daily for 1 week then every other day for 1 week then as needed. Will use clobetasol scalp solution on the scalp BID for 2 weeks then daily for 1 week then every other day for 1 week then as needed. Dermatitis mostly just neck up at this time. Have treated for seb derm in the past with minimal improvement. Last biopsy in April of area on scalp more consistent with dermatitis, no psoriasis noted. He will follow up in 6-8 weeks for recheck.     Related Procedures  Follow Up In Dermatology - Established Patient  Follow Up In Dermatology - Established Patient    Related Medications  clobetasol (Temovate) 0.05 % external solution  Apply to affected areas twice daily on the scalp for 2 weeks then daily x1 week then every other day x1 week then as needed. When using as needed, use less than 14 days per month.    hydrocortisone 2.5 %  cream  Apply to affected areas twice daily to the face/arms for 2 weeks then daily for 1 week then every other day for 1 week then stop. Then may used as needed when active. When using for maintenance, use less than 14 days per month.        Return in 6-8 weeks for recheck dermatitis and 6 months for routine skin check or return to clinic sooner if needed

## 2024-10-14 ENCOUNTER — TREATMENT (OUTPATIENT)
Dept: PHYSICAL THERAPY | Facility: CLINIC | Age: 79
End: 2024-10-14
Payer: MEDICARE

## 2024-10-14 DIAGNOSIS — R26.81 GAIT INSTABILITY: ICD-10-CM

## 2024-10-14 PROCEDURE — 97110 THERAPEUTIC EXERCISES: CPT | Mod: GP | Performed by: PHYSICAL THERAPIST

## 2024-10-14 PROCEDURE — 97530 THERAPEUTIC ACTIVITIES: CPT | Mod: GP | Performed by: PHYSICAL THERAPIST

## 2024-10-14 ASSESSMENT — PAIN - FUNCTIONAL ASSESSMENT: PAIN_FUNCTIONAL_ASSESSMENT: 0-10

## 2024-10-14 ASSESSMENT — PAIN SCALES - GENERAL: PAINLEVEL_OUTOF10: 0 - NO PAIN

## 2024-10-14 NOTE — PROGRESS NOTES
"Physical Therapy Treatment    Patient Name: Jhonny Baron  MRN: [id number]  Today's Date: 10/14/2024    Current Problem  Problem List Items Addressed This Visit             ICD-10-CM    Gait instability R26.81       Insurance:  Payor: [employer] MEDICARE / Plan: [employer] MEDICARE / Product Type: *No Product type* /   Number of Treatments Authorized: 5/20 (auth)  Certification Period Start Date: 09/03/24  Certification Period End Date: 12/02/24    Subjective   General  Reason for Referral: Gait and balance deficits  Referred By: LEMUEL Arango  Past Medical History Relevant to Rehab: High BP, neuropathy, hernia repairs, prostate cancer, skin cancer  General Comment: States he is \"doing his best\" to work in his HEP.    Performing HEP?: Yes    Precautions  Precautions  Precautions Comment: moderate fall risk  Pain  Pain Assessment: 0-10  0-10 (Numeric) Pain Score: 0 - No pain    Objective   Treatments:    Therapeutic Exercise  Therapeutic Exercise Activity 1: SciFit: L3 - 8 minutes - seat 13  Therapeutic Exercise Activity 2: Heel/toe raises x 1 min  Therapeutic Exercise Activity 3: Standing R/L alt march with light UE support x 1 min  Therapeutic Exercise Activity 4: Standing opposite UE/LE at // bars: x 10  Therapeutic Exercise Activity 5: Gastroc stretch slantboard x 1 min  Therapeutic Exercise Activity 6: Seated hamstring stretch: 30 s x 3 -B  Therapeutic Exercise Activity 7: Rocking x 1 min each  Therapeutic Exercise Activity 8: Dynamics: marches, [place], Butt kicks, Side steps 20 ft x 2 ea    Balance/Neuromuscular Re-Education  Balance/Neuromuscular Re-Education Activity 3: R,L SLS x 10 sec w/ 2 finger support  Balance/Neuromuscular Re-Education Activity 4: R/L tandem, L/R tandem - 1`0   hold x 2 ea / light UE support  Balance/Neuromuscular Re-Education Activity 6: AirEx pad: EO x 1 min - EC x 3 bouts.  Balance/Neuromuscular Re-Education Activity 7: Air Ex Beam: side steps x 1 min - light " "support.  Balance/Neuromuscular Re-Education Activity 8: R/L lateral step over AirEx beam x 1 min  Balance/Neuromuscular Re-Education Activity 9: R/L ALT LE taps of 4\" cone: x 1 min  Balance/Neuromuscular Re-Education Activity 10: Walking with head nods , head turns and changing speeds: 25 ft x 2 ea.    Assessment:  PT Assessment  Assessment Comment: Demonstrates good effort at today's session. Able to perform without pain or need of rest breaks. Remains challenged with single leg activities and dynamic balance with head turns. Will benefit from continued exercise towards goals.    Plan:  OP PT Plan  Treatment/Interventions: Electrical stimulation, Gait training, Manual therapy, Neuromuscular re-education, Self care/ home management, Taping techniques, Therapeutic activities, Therapeutic exercises, Education/ Instruction  PT Plan: Skilled PT (gait/balance exercises, dual tasking)  PT Frequency: 1 time per week  Duration: 8-10 weeks, reducing frequency as goals are met  Onset Date: 04/03/24  Certification Period Start Date: 09/03/24  Certification Period End Date: 12/02/24  Number of Treatments Authorized: 5/20 (auth)  Rehab Potential: Good  Plan of Care Agreement: Patient    Goals:  Active       Gait/balance       STG/LTG       Start:  09/03/24    Expected End:  11/12/24       STG  1) Patient will improve perform sit<>stand from std chair without UE support to perform functional activities at home and in the community in 4 weeks.  2) Patient will be able to ambulate x 150' using walking stick safely for short community ambulation and to reduce fall risk.   3) Patient will be independent with HEP to allow for continued improvement in daily tasks at home and in the community in 3 visits.   LTG  1) Patient will have >/=4+/5 strength in lateral hip musculature to aid in stability with ambulation on varied surfaces in community in 8 weeks  2) Patient will be able to perform proper squatting technique in order to prevent " increased pain with daily tasks in 8 weeks.  3) Patient will be able to perform >10 seconds of SLS on even ground in order to allow for safe ambulation and to demonstrate reduced fall risk within the community in 8 weeks.   4) Patient will be able to ambulate x 300' with LRAD or without device for improved ability to navigate community ambulation.   5) Pt will perform 5TSTS without UE support to show improved transitions and strength by discharge.                 Time Calculation  Start Time: 1020  Stop Time: 1105  Time Calculation (min): 45 min  PT Therapeutic Procedures Time Entry  Therapeutic Exercise Time Entry: 20  Therapeutic Activity Time Entry: 23,

## 2024-10-17 LAB
LAB AP ASR DISCLAIMER: NORMAL
LABORATORY COMMENT REPORT: NORMAL
PATH REPORT.FINAL DX SPEC: NORMAL
PATH REPORT.GROSS SPEC: NORMAL
PATH REPORT.MICROSCOPIC SPEC OTHER STN: NORMAL
PATH REPORT.RELEVANT HX SPEC: NORMAL
PATH REPORT.TOTAL CANCER: NORMAL

## 2024-10-18 ENCOUNTER — TELEPHONE (OUTPATIENT)
Dept: DERMATOLOGY | Facility: CLINIC | Age: 79
End: 2024-10-18
Payer: MEDICARE

## 2024-10-18 NOTE — TELEPHONE ENCOUNTER
A, B and C are Skin cancer. Order placed for referral to derm surgery for Mohs for left buccal cheek and left post auricular sulcus and excision for left forearm.  Patient needs to return to clinic in 6 months for next routine skin check.

## 2024-10-21 ENCOUNTER — APPOINTMENT (OUTPATIENT)
Dept: PHYSICAL THERAPY | Facility: CLINIC | Age: 79
End: 2024-10-21
Payer: MEDICARE

## 2024-10-25 ENCOUNTER — TREATMENT (OUTPATIENT)
Dept: PHYSICAL THERAPY | Facility: CLINIC | Age: 79
End: 2024-10-25
Payer: MEDICARE

## 2024-10-25 DIAGNOSIS — R26.81 GAIT INSTABILITY: ICD-10-CM

## 2024-10-25 PROCEDURE — 97112 NEUROMUSCULAR REEDUCATION: CPT | Mod: GP | Performed by: PHYSICAL THERAPIST

## 2024-10-25 PROCEDURE — 97110 THERAPEUTIC EXERCISES: CPT | Mod: GP | Performed by: PHYSICAL THERAPIST

## 2024-10-25 ASSESSMENT — PAIN - FUNCTIONAL ASSESSMENT: PAIN_FUNCTIONAL_ASSESSMENT: 0-10

## 2024-10-25 ASSESSMENT — PAIN SCALES - GENERAL: PAINLEVEL_OUTOF10: 0 - NO PAIN

## 2024-10-25 NOTE — PROGRESS NOTES
"Physical Therapy Treatment    Patient Name: Jhonny Baron  MRN: [id number]  Today's Date: 10/25/2024    Current Problem  Problem List Items Addressed This Visit             ICD-10-CM    Gait instability R26.81       Insurance:  Payor: [employer]ICARE / Plan: [employer] MEDICARE / Product Type: *No Product type* /   Number of Treatments Authorized: 6 /20 (auth)  Certification Period Start Date: 09/03/24  Certification Period End Date: 12/02/24    Subjective   General  Reason for Referral: Gait and balance deficits  Referred By: LEMUEL Arango  Past Medical History Relevant to Rehab: High BP, neuropathy, hernia repairs, prostate cancer, skin cancer  General Comment: States things have been busy at home - generally doing better. Feeling looser and balance is improving \"a little bit, 15-20%.\"    Performing HEP?: No  Issued duplicate copy of HEP.   Precautions  Precautions  Precautions Comment: moderate fall risk  Pain  Pain Assessment: 0-10  0-10 (Numeric) Pain Score: 0 - No pain    Objective   Treatments:    Therapeutic Exercise  Therapeutic Exercise Activity 1: SciFit: L3 - 6 minutes - seat 13  Therapeutic Exercise Activity 2: TG: level 6 - 2 x 1 min  Therapeutic Exercise Activity 8: Dynamics: marches, [employer], Butt kicks, Side steps 20 ft x 2 ea    Balance/Neuromuscular Re-Education  Balance/Neuromuscular Re-Education Activity 1: 6\" step over w/o support x 2 min  Balance/Neuromuscular Re-Education Activity 2: Slant board (partial) w/head turns x 10  Balance/Neuromuscular Re-Education Activity 3: R/L ALT bird dog x 1 min  Balance/Neuromuscular Re-Education Activity 4: Rock and reach: x 20 -ea  Balance/Neuromuscular Re-Education Activity 5: Walking slow, regular, fast for pace changing.  Balance/Neuromuscular Re-Education Activity 6: Walking nods, head turns  Balance/Neuromuscular Re-Education Activity 7: 1 foot on 9\" stool w/ head turns - x 5 - B  Balance/Neuromuscular Re-Education Activity 8: " Static standing on Air[employer] Beam: 1 min -  EC x 5 - 1-2 second hold time ea.    OP EDUCATION:  Outpatient Education  Education Comment: Access Code: [email or web address]  URL: [email or web address]  Date: 09/24/2024  Prepared by: Medardo Florentino    Exercises  - Staggered Stance Forward Backward Weight Shift with Unilateral Counter Support  - 1 x daily - 7 x weekly - 2 sets - 10 reps  - Side Stepping with Unilateral Counter Support  - 1 x daily - 7 x weekly - 2 sets - 10 reps  - Bird Dog on Counter  - 1 x daily - 7 x weekly - 2 sets - 10 reps    Assessment:  PT Assessment  Assessment Comment: Demonstrates good effort toward strengthening and balance exercises. Performing witout pain. Will benefit from continued therapy towards goals.  Performing TG to ~ 90 deg knee flexion.   Plan:  OP PT Plan  Treatment/Interventions: Electrical stimulation, Gait training, Manual therapy, Neuromuscular re-education, Self care/ home management, Taping techniques, Therapeutic activities, Therapeutic exercises, Education/ Instruction  PT Plan: Skilled PT (gait/balance exercises, dual tasking)  PT Frequency: 1 time per week  Duration: 8-10 weeks, reducing frequency as goals are met  Onset Date: 04/03/24  Certification Period Start Date: 09/03/24  Certification Period End Date: 12/02/24  Number of Treatments Authorized: 6 /20 (auth)  Rehab Potential: Good  Plan of Care Agreement: Patient  Check compliance of issued HEP - monitor response to TG exercises.   Goals:  Active       Gait/balance       STG/LTG       Start:  09/03/24    Expected End:  11/12/24       STG  1) Patient will improve perform sit<>stand from std chair without UE support to perform functional activities at home and in the community in 4 weeks.  2) Patient will be able to ambulate x 150' using walking stick safely for short community ambulation and to reduce fall risk.   3) Patient will be independent with HEP to allow for continued improvement in daily tasks at  home and in the community in 3 visits.   LTG  1) Patient will have >/=4+/5 strength in lateral hip musculature to aid in stability with ambulation on varied surfaces in community in 8 weeks  2) Patient will be able to perform proper squatting technique in order to prevent increased pain with daily tasks in 8 weeks.  3) Patient will be able to perform >10 seconds of SLS on even ground in order to allow for safe ambulation and to demonstrate reduced fall risk within the community in 8 weeks.   4) Patient will be able to ambulate x 300' with LRAD or without device for improved ability to navigate community ambulation.   5) Pt will perform 5TSTS without UE support to show improved transitions and strength by discharge.                 Time Calculation  Start Time: 1456  Stop Time: 1540  Time Calculation (min): 44 min  PT Therapeutic Procedures Time Entry  Neuromuscular Re-Education Time Entry: 25  Therapeutic Exercise Time Entry: 15,

## 2024-10-30 ENCOUNTER — APPOINTMENT (OUTPATIENT)
Dept: OPHTHALMOLOGY | Facility: CLINIC | Age: 79
End: 2024-10-30
Payer: MEDICARE

## 2024-10-30 ENCOUNTER — PROCEDURE VISIT (OUTPATIENT)
Dept: DERMATOLOGY | Facility: CLINIC | Age: 79
End: 2024-10-30
Payer: MEDICARE

## 2024-10-30 VITALS — DIASTOLIC BLOOD PRESSURE: 79 MMHG | SYSTOLIC BLOOD PRESSURE: 162 MMHG | HEART RATE: 56 BPM

## 2024-10-30 DIAGNOSIS — D04.30 SQUAMOUS CELL CARCINOMA IN SITU (SCCIS) OF SKIN OF FACE: ICD-10-CM

## 2024-10-30 PROCEDURE — 17312 MOHS ADDL STAGE: CPT | Performed by: DERMATOLOGY

## 2024-10-30 PROCEDURE — 99214 OFFICE O/P EST MOD 30 MIN: CPT | Performed by: DERMATOLOGY

## 2024-10-30 PROCEDURE — 17311 MOHS 1 STAGE H/N/HF/G: CPT | Performed by: DERMATOLOGY

## 2024-10-30 PROCEDURE — 13132 CMPLX RPR F/C/C/M/N/AX/G/H/F: CPT | Performed by: DERMATOLOGY

## 2024-11-06 ENCOUNTER — APPOINTMENT (OUTPATIENT)
Dept: DERMATOLOGY | Facility: CLINIC | Age: 79
End: 2024-11-06
Payer: MEDICARE

## 2024-11-06 DIAGNOSIS — C44.41 BASAL CELL CARCINOMA (BCC) OF SCALP: ICD-10-CM

## 2024-11-06 PROCEDURE — 17311 MOHS 1 STAGE H/N/HF/G: CPT | Performed by: DERMATOLOGY

## 2024-11-06 PROCEDURE — 17312 MOHS ADDL STAGE: CPT | Performed by: DERMATOLOGY

## 2024-11-06 NOTE — PROGRESS NOTES
Office Visit Note  Date: 11/6/2024  Surgeon:  Rosalie Oneil MD  Office Location:  [address] [facility]  [address]  [address]  [facility] [address]  Dept: [phone]  Dept Fax: [phone]  Referring Provider: Giancarlo Anguiano, APRN-CNP  [address]  [address]  [place] [address]    Subjective   Jhonny Baron is a 78 y.o. male who presents for the following: MOHS Surgery    According to the patient, the lesion has been present for approximately greater than 1 year at the time of diagnosis.  The lesion is not causing symptoms.  The lesion has not been treated previously.    The patient does not have a pacemaker / defibrillator.  The patient does not have a heart valve / joint replacement.    The patient is on blood thinners.  The patient does not have a history of hepatitis B or C.  The patient does not have a history of HIV.  The patient does have a history of immunosuppression (e.g. organ transplantation, malignancy, medications)    Review of Systems:  No other skin or systemic complaints other than what is documented elsewhere in the note.    MEDICAL HISTORY: clinically relevant history including significant past medical history, medications and allergies was reviewed and documented in Epic.    Objective   Well appearing patient in no apparent distress; mood and affect are within normal limits.  Vital signs: See record.  Noted on the Left Postauricular Sulcus  Is a 2.1 x 1.2 cm scar      The patient confirmed the identified site.    Discussion:  The nature of the diagnosis was explained. The lesion is a skin cancer.  It has a risk of local growth and distant spread. The condition is associated with sun exposure.  Warning signs of non-melanoma skin cancer discussed. Patient was instructed to perform monthly self skin examination.  We recommended that the patient have regular full skin exams given an increased risk of subsequent skin cancers. The patient was instructed to use  sun protective behaviors including use of broad spectrum sunscreens and sun protective clothing to reduce risk of skin cancers.      Risks, benefits, side effects of Mohs surgery were discussed with patient and the patient voiced understanding.  It was explained that even though the cure rate of Mohs is very high it is not 100%. Risks of surgery including but not limited to bleeding, infection, numbness, nerve damage, and scar were reviewed.  Discussion included wound care requirements, activity restrictions, likely scar outcome and time to heal.     After Mohs surgery, the defect may need to be repaired surgically and the scar may be longer than the original lesion.  Reconstruction options, risks, and benefits were reviewed including second intention healing, linear repair (4-1 ratio was explained), local flaps, skin grafts, cartilage grafts and interpolation flaps (the need for multiple surgeries was explained). Possible outcomes were reviewed including likely scar appearance, failure of flap survival, infection, bleeding and the need for revision surgery.     The pathology was reviewed, the photograph was reviewed, and the referring physician's note was reviewed.    Patient elected for Mohs surgery.

## 2024-11-06 NOTE — PROGRESS NOTES
Mohs Surgery Operative Note    Date of Surgery:  11/6/2024  Surgeon:  Rosalie Oneil MD  Office Location:  [address] [facility]  [address]  [address]  [facility] [address]  Dept: [phone]  Dept Fax: [phone]  Referring Provider: Giancarlo Anguiano, APRN-CNP  [address] [facility]  [address]  [place] [address]      Assessment/Plan   Pre-procedure:   Obtained informed consent: written from patient  The surgical site was identified and confirmed with the patient.     Intra-operative:   Audible time out called at : 10:25 AM 11/06/24  by: Dc Mcmanus MA   Verified patient name, birthdate, site, specimen bottle label & requisition.    The planned procedure(s) was again reviewed with the patient. The risks of bleeding, infection, nerve damage and scarring were reviewed. Written authorization was obtained. The patient identity, surgical site, and planned procedure(s) were verified. The provider acted as both surgeon and pathologist.     Basal cell carcinoma (BCC) of scalp  Left Postauricular Sulcus  Mohs surgery  Consent obtained: written    Universal Protocol:  Procedure explained and questions answered to patient or proxy's satisfaction: Yes    Test results available and properly labeled: Yes    Pathology report reviewed: Yes    External notes reviewed: Yes    Photo or diagram used for site identification: Yes    Site/side marked: Yes    Slide independently reviewed by Mohs surgeon: Yes    Immediately prior to procedure a time out was called: Yes    Patient identity confirmed: verbally with patient  Preparation: Patient was prepped and draped in usual sterile fashion      Anticoagulation:  Is the patient taking prescription anticoagulant and/or aspirin prescribed/recommended by a physician? No    Was the anticoagulation regimen changed prior to Mohs? No      Anesthesia:  Anesthesia method: local infiltration  Local anesthetic: lidocaine 2% WITH epi    Procedure Details:  Case ID  Number: [id number]  Biopsy accession number: [id number]  Date of biopsy: 10/8/2024  Pre-Op diagnosis: basal cell carcinoma  BCC subtype: nodular  Surgical site (from skin exam): Left Postauricular Sulcus  Pre-operative length (cm): 2.1  Pre-operative width (cm): 1.2  Indications for Mohs surgery: anatomic location where tissue conservation is critical  Previously treated? No      Micrographic Surgery Details:  Post-operative length (cm): 3  Post-operative width (cm): 3  Number of Mohs stages: 2    Stage 1     Comments: The patient was brought into the operating room and placed in the procedure chair in the appropriate position.  The area positive by previous biopsy was identified and confirmed with the patient. The area of clinically obvious tumor was debulked using a curette and/or scalpel as needed. An incision was made following the Mohs approach through the skin. The specimen was taken to the lab, divided into 2 piece(s) and appropriately chromacoded and processed.  Tumor was seen on both the lateral and deep margins as indicated on the on the Mohs map.  Infiltrative basal cell carcinoma. Histologic examination revealed thin strands and small-angulated aggregates of atypical basaloid cells.  Tumor features identified on Mohs section: basal carcinoma   Depth of invasion: dermis    Stage 2     Comments: The area of positivity as noted on the Mohs map in the previous stage was identified and removed using the Mohs technique. The specimen was taken to the lab and appropriately chromacoded and processed in 2 piece(s).  Tumor features identified on Mohs section: no tumor identified  Depth of defect: subcutaneous fat    Patient tolerance of procedure: tolerated well, no immediate complications    Reconstruction:  Was the defect reconstructed?: No   Repair: After a discussion with the patient regarding the options for wound closure, a decision was made to proceed with second intention healing.  Dressing/Follow-up:  Surgifoam was placed in the wound. A pressure dressing was placed to help stabilize the wound and to minimize the risk of postoperative bleeding. Wound care was discussed, and the patient was given written post-operative wound care instructions.  Hemostasis achieved with: pressure, Gelfoam and electrodesiccation  Outcome: patient tolerated procedure well with no complications    Post-procedure details: sterile dressing applied and wound care instructions given    Dressing type: pressure dressing    Additional details:  Repair: After a discussion with the patient regarding the options for wound closure, a decision was made to proceed with second intention healing.  Dressing F/U: Surgifoam was placed in the wound. A pressure dressing was placed to help stabilize the wound and to minimize the risk of postoperative bleeding. Wound care was discussed, and the patient was given written post-operative wound care instructions.    The final repair measured 3.0 x 3.0 cm          Wound care was discussed, and the patient was given written post-operative wound care instructions.      The patient will follow up with Rosalie Oneil MD as needed for any post operative problems or concerns, and will follow up with their primary dermatologist as scheduled.

## 2024-11-13 DIAGNOSIS — R53.83 OTHER FATIGUE: Primary | ICD-10-CM

## 2024-11-20 ENCOUNTER — APPOINTMENT (OUTPATIENT)
Dept: DERMATOLOGY | Facility: CLINIC | Age: 79
End: 2024-11-20
Payer: MEDICARE

## 2024-11-20 DIAGNOSIS — Z51.89 VISIT FOR WOUND CHECK: ICD-10-CM

## 2024-11-20 DIAGNOSIS — D48.5 NEOPLASM OF UNCERTAIN BEHAVIOR OF SKIN: Primary | ICD-10-CM

## 2024-11-20 PROCEDURE — 87070 CULTURE OTHR SPECIMN AEROBIC: CPT | Performed by: DERMATOLOGY

## 2024-11-20 PROCEDURE — 1159F MED LIST DOCD IN RCRD: CPT | Performed by: DERMATOLOGY

## 2024-11-20 PROCEDURE — 1157F ADVNC CARE PLAN IN RCRD: CPT | Performed by: DERMATOLOGY

## 2024-11-20 PROCEDURE — 11104 PUNCH BX SKIN SINGLE LESION: CPT | Performed by: DERMATOLOGY

## 2024-11-22 ENCOUNTER — LAB (OUTPATIENT)
Dept: LAB | Facility: LAB | Age: 79
End: 2024-11-22
Payer: MEDICARE

## 2024-11-22 ENCOUNTER — TELEPHONE (OUTPATIENT)
Dept: DERMATOLOGY | Facility: CLINIC | Age: 79
End: 2024-11-22

## 2024-11-22 DIAGNOSIS — R53.83 OTHER FATIGUE: ICD-10-CM

## 2024-11-22 PROCEDURE — 84443 ASSAY THYROID STIM HORMONE: CPT

## 2024-11-22 PROCEDURE — 82607 VITAMIN B-12: CPT

## 2024-11-22 PROCEDURE — 85027 COMPLETE CBC AUTOMATED: CPT

## 2024-11-22 PROCEDURE — 80053 COMPREHEN METABOLIC PANEL: CPT

## 2024-11-22 PROCEDURE — 36415 COLL VENOUS BLD VENIPUNCTURE: CPT

## 2024-11-22 NOTE — TELEPHONE ENCOUNTER
Surgical site: LEFT FOREARM - ANTERIOR   Date of procedure 11/20/2024    A voicemail was left for the patient about the pathology result which showed that the area is most consistent to a scar or recent trauma. We will continue to monitor the area. The wound culture is also still pending- we will let you know when we have those results. The patient was advised to call with any questions.

## 2024-11-23 LAB
ALBUMIN SERPL BCP-MCNC: 4.9 G/DL (ref 3.4–5)
ALP SERPL-CCNC: 73 U/L (ref 33–136)
ALT SERPL W P-5'-P-CCNC: 14 U/L (ref 10–52)
ANION GAP SERPL CALC-SCNC: 16 MMOL/L (ref 10–20)
AST SERPL W P-5'-P-CCNC: 22 U/L (ref 9–39)
BACTERIA SPEC CULT: NORMAL
BILIRUB SERPL-MCNC: 1.1 MG/DL (ref 0–1.2)
BUN SERPL-MCNC: 32 MG/DL (ref 6–23)
CALCIUM SERPL-MCNC: 9.9 MG/DL (ref 8.6–10.6)
CHLORIDE SERPL-SCNC: 103 MMOL/L (ref 98–107)
CO2 SERPL-SCNC: 29 MMOL/L (ref 21–32)
CREAT SERPL-MCNC: 1.23 MG/DL (ref 0.5–1.3)
EGFRCR SERPLBLD CKD-EPI 2021: 59 ML/MIN/1.73M*2
ERYTHROCYTE [DISTWIDTH] IN BLOOD BY AUTOMATED COUNT: 14.9 % (ref 11.5–14.5)
GLUCOSE SERPL-MCNC: 106 MG/DL (ref 74–99)
GRAM STN SPEC: NORMAL
GRAM STN SPEC: NORMAL
HCT VFR BLD AUTO: 42.6 % (ref 41–52)
HGB BLD-MCNC: 13.4 G/DL (ref 13.5–17.5)
MCH RBC QN AUTO: 29.8 PG (ref 26–34)
MCHC RBC AUTO-ENTMCNC: 31.5 G/DL (ref 32–36)
MCV RBC AUTO: 95 FL (ref 80–100)
NRBC BLD-RTO: 0 /100 WBCS (ref 0–0)
PLATELET # BLD AUTO: 769 X10*3/UL (ref 150–450)
POTASSIUM SERPL-SCNC: 4.5 MMOL/L (ref 3.5–5.3)
PROT SERPL-MCNC: 7.3 G/DL (ref 6.4–8.2)
RBC # BLD AUTO: 4.49 X10*6/UL (ref 4.5–5.9)
SODIUM SERPL-SCNC: 143 MMOL/L (ref 136–145)
TSH SERPL-ACNC: 1.77 MIU/L (ref 0.44–3.98)
VIT B12 SERPL-MCNC: 557 PG/ML (ref 211–911)
WBC # BLD AUTO: 7.7 X10*3/UL (ref 4.4–11.3)

## 2024-11-25 NOTE — RESULT ENCOUNTER NOTE
Mita- can you please let patient know the good news- his culture is negative. No further workup needed for the spot on his forearm.

## 2024-11-27 ENCOUNTER — APPOINTMENT (OUTPATIENT)
Dept: DERMATOLOGY | Facility: CLINIC | Age: 79
End: 2024-11-27
Payer: MEDICARE

## 2024-12-03 DIAGNOSIS — I10 PRIMARY HYPERTENSION: ICD-10-CM

## 2024-12-03 RX ORDER — METOPROLOL TARTRATE 100 MG/1
50 TABLET ORAL 2 TIMES DAILY
Qty: 30 TABLET | Refills: 11 | Status: SHIPPED | OUTPATIENT
Start: 2024-12-03 | End: 2025-12-03

## 2024-12-06 ENCOUNTER — TELEPHONE (OUTPATIENT)
Dept: DERMATOLOGY | Facility: CLINIC | Age: 79
End: 2024-12-06
Payer: MEDICARE

## 2024-12-06 NOTE — TELEPHONE ENCOUNTER
Surgical site: Left Forearm  Date of procedure 11/20/24    A voicemail was left for the patient about the tissue culture result which was negative. No further treatment needed at this time. The patient was advised to call with any questions.

## 2024-12-09 DIAGNOSIS — I48.91 ATRIAL FIBRILLATION, UNSPECIFIED TYPE (MULTI): ICD-10-CM

## 2024-12-09 RX ORDER — RIVAROXABAN 15 MG/1
TABLET, FILM COATED ORAL
Qty: 90 TABLET | Refills: 3 | Status: SHIPPED | OUTPATIENT
Start: 2024-12-09

## 2024-12-30 DIAGNOSIS — E78.00 HIGH CHOLESTEROL: ICD-10-CM

## 2024-12-30 RX ORDER — SIMVASTATIN 20 MG/1
20 TABLET, FILM COATED ORAL NIGHTLY
Qty: 90 TABLET | Refills: 3 | Status: SHIPPED | OUTPATIENT
Start: 2024-12-30

## 2025-01-08 ENCOUNTER — DOCUMENTATION (OUTPATIENT)
Dept: PHYSICAL THERAPY | Facility: CLINIC | Age: 80
End: 2025-01-08
Payer: MEDICARE

## 2025-01-08 NOTE — PROGRESS NOTES
Discharge Summary    Name: Jhonny Baron  MRN: [id number]  : 1945  Date: 25    Discharge Summary: PT    Discharge Information: Date of last visit 10/25/24    Therapy Summary: Patient is 78 year old evaluated and treated x 6 visits to physical therapy with signs and symptoms consistent with gait instability. Patient had decreased ROM, static and dynamic balance and strength deficits limiting functional mobility and ADLs.     Discharge Status: Good progress toward goals.      Rehab Discharge Reason: Achieved all and/or the most significant goals(s) and Failed to schedule and/or keep follow-up appointment(s)

## 2025-01-15 ENCOUNTER — TELEPHONE (OUTPATIENT)
Dept: CARDIOLOGY | Facility: CLINIC | Age: 80
End: 2025-01-15
Payer: MEDICARE

## 2025-01-15 NOTE — TELEPHONE ENCOUNTER
Pt called to speak with Dr Darnell regarding increasing cost of xarelto. Attempted to call pt and offer financial assistance application. Left message

## 2025-01-20 ENCOUNTER — APPOINTMENT (OUTPATIENT)
Dept: DERMATOLOGY | Facility: CLINIC | Age: 80
End: 2025-01-20
Payer: MEDICARE

## 2025-01-22 ENCOUNTER — TELEPHONE (OUTPATIENT)
Dept: DERMATOLOGY | Facility: CLINIC | Age: 80
End: 2025-01-22
Payer: MEDICARE

## 2025-01-22 NOTE — TELEPHONE ENCOUNTER
Pt called with questions about needing an appointment with Dr. Oneil. I spoke with Dr. Oneil and pt needed a follow up appt with Giancarlo Anguiano for his left forearm posterior. I called, no answer, left message. Stated in message that Dr. Oneil does not need to see him for follow up he will see Giancarlo Anguiano for follow up. Informed pt that he has skin check/ follow up appt scheduled with Giancarlo Anguiano 2/14/25.

## 2025-02-14 ENCOUNTER — APPOINTMENT (OUTPATIENT)
Dept: DERMATOLOGY | Facility: CLINIC | Age: 80
End: 2025-02-14
Payer: MEDICARE

## 2025-02-14 DIAGNOSIS — Z85.828 HISTORY OF NONMELANOMA SKIN CANCER: ICD-10-CM

## 2025-02-14 DIAGNOSIS — L82.0 INFLAMED SEBORRHEIC KERATOSIS: ICD-10-CM

## 2025-02-14 DIAGNOSIS — L81.4 LENTIGO: ICD-10-CM

## 2025-02-14 DIAGNOSIS — L90.5 SCAR CONDITIONS AND FIBROSIS OF SKIN: ICD-10-CM

## 2025-02-14 DIAGNOSIS — L57.9 SKIN CHANGES DUE TO CHRONIC EXPOSURE TO NONIONIZING RADIATION: ICD-10-CM

## 2025-02-14 DIAGNOSIS — L30.9 DERMATITIS: ICD-10-CM

## 2025-02-14 DIAGNOSIS — D48.5 NEOPLASM OF UNCERTAIN BEHAVIOR OF SKIN: Primary | ICD-10-CM

## 2025-02-14 DIAGNOSIS — D18.01 ANGIOMA OF SKIN: ICD-10-CM

## 2025-02-14 DIAGNOSIS — L82.1 SEBORRHEIC KERATOSIS: ICD-10-CM

## 2025-02-14 DIAGNOSIS — D22.9 BENIGN NEVUS: ICD-10-CM

## 2025-02-14 PROCEDURE — 1036F TOBACCO NON-USER: CPT | Performed by: NURSE PRACTITIONER

## 2025-02-14 PROCEDURE — 1160F RVW MEDS BY RX/DR IN RCRD: CPT | Performed by: NURSE PRACTITIONER

## 2025-02-14 PROCEDURE — 1159F MED LIST DOCD IN RCRD: CPT | Performed by: NURSE PRACTITIONER

## 2025-02-14 PROCEDURE — 11102 TANGNTL BX SKIN SINGLE LES: CPT | Performed by: NURSE PRACTITIONER

## 2025-02-14 PROCEDURE — 69100 BIOPSY OF EXTERNAL EAR: CPT | Performed by: NURSE PRACTITIONER

## 2025-02-14 PROCEDURE — 1157F ADVNC CARE PLAN IN RCRD: CPT | Performed by: NURSE PRACTITIONER

## 2025-02-14 PROCEDURE — 99213 OFFICE O/P EST LOW 20 MIN: CPT | Performed by: NURSE PRACTITIONER

## 2025-02-14 NOTE — PROGRESS NOTES
Subjective     Jhonny Baron is a 79 y.o. male who presents for the following: Skin Check.     Review of Systems:  No other skin or systemic complaints other than what is documented elsewhere in the note.    The following portions of the chart were reviewed this encounter and updated as appropriate:   Tobacco  Allergies  Meds  Problems  Med Hx  Surg Hx         Skin Cancer History  Biopsy Date Type Location Status   10/8/24 BCC Left Forearm - Posterior Refer for Excision   10/8/24 SCC Left Buccal Cheek Treatment Complete  12/6/24   10/8/24 SCC Left Postauricular Sulcus Refer Mohs/Surgeon  12/6/24       Specialty Problems          Dermatology Problems    Actinic keratosis    Atopic dermatitis, unspecified    Dermatitis, unspecified     Tinea cruris on chronic eczema         Hemangioma of skin and subcutaneous tissue    Melanocytic nevi of trunk    Neoplasm of uncertain behavior of skin    Personal history of other malignant neoplasm of skin    Rosacea, unspecified    Scar condition and fibrosis of skin    Contact dermatitis and eczema     Contact dermatitis vs Xerotic eczema -- treated with oral CS 12/2020.   Per path stable with regular use of aveeno.   Using triamcinolone cream/ointment for acute flare-ups.   Continue using DOVE soap -- nothing stronger.    No longer seeing dermatology.   Previously saw Silvio.          Skin cancer of face    Psoriasis        Objective   Well appearing patient in no apparent distress; mood and affect are within normal limits.    A focused skin examination was performed waist up. All findings within normal limits unless otherwise noted below.    Assessment/Plan   1. Neoplasm of uncertain behavior of skin (2)  Right Mid Helix  4.5 mm ulcer          Lesion biopsy  Type of biopsy: tangential    Informed consent: discussed and consent obtained    Timeout: patient name, date of birth, surgical site, and procedure verified    Procedure prep:  Patient was prepped and  draped  Anesthesia: the lesion was anesthetized in a standard fashion    Anesthetic:  1% lidocaine plain local infiltration  Instrument used: DermaBlade    Hemostasis achieved with: electrodesiccation    Outcome: patient tolerated procedure well    Post-procedure details: wound care instructions given    Additional details:  Cleaned area with isopropyl alcohol prior to anesthesia or biopsy. Applied thin layer of vaseline and covered with bandaid after procedure      Staff Communication: Dermatology Local Anesthesia: 1 % Plain Lidocaine - Amount: 0.5 ml per lesion    Specimen 1 - Dermatopathology- DERM LAB  Differential Diagnosis: NMSC vs CNH  Check Margins Yes/No?:    Comments:    Dermpath Lab: Routine Histopathology (formalin-fixed tissue)    Left Dorsal Hand  12.5 mm red crusty plaque          Lesion biopsy  Type of biopsy: tangential    Informed consent: discussed and consent obtained    Timeout: patient name, date of birth, surgical site, and procedure verified    Procedure prep:  Patient was prepped and draped  Anesthesia: the lesion was anesthetized in a standard fashion    Anesthetic:  1% lidocaine plain local infiltration  Instrument used: DermaBlade    Hemostasis achieved with: electrodesiccation    Outcome: patient tolerated procedure well    Post-procedure details: wound care instructions given    Additional details:  Cleaned area with isopropyl alcohol prior to anesthesia or biopsy. Applied thin layer of vaseline and covered with bandaid after procedure      Staff Communication: Dermatology Local Anesthesia: 1 % Plain Lidocaine - Amount: 0.5 ml per lesion    Specimen 2 - Dermatopathology- DERM LAB  Differential Diagnosis: NMSC  Check Margins Yes/No?:    Comments:    Dermpath Lab: Routine Histopathology (formalin-fixed tissue)    NUB  - Given uncertainty in clinical diagnosis, shave biopsy is recommended in clinic today.  - The patient expressed understanding, is in agreement with this plan, and wishes to  proceed with biopsy.  - Oral and written wound care instructions provided.  - Advised patient that the office will call within 2 weeks to discuss biopsy results.      Related Procedures  Follow Up In Dermatology - Established Patient    2. Angioma of skin  Scattered cherry-red papule(s).    A cherry hemangioma is a small macule (small, flat, smooth area) or papule (small, solid bump) formed from an overgrowth of tiny blood vessels in the skin. Cherry hemangiomas are characteristically red or purplish in color. They often first appear in middle adulthood and usually increase in number with age. Cherry hemangiomas are noncancerous (benign) and are common in adults.    The present appearance of the lesion is not worrisome but it should continue to be observed and testing/treatment may be warranted if change occurs.    Related Procedures  Follow Up In Dermatology - Established Patient    3. Benign nevus  Scattered, uniform and benign-appearing, regular brown melanocytic papules and macules.    The present appearance of the lesion is not worrisome but it should continue to be observed and testing/treatment may be warranted if change occurs.    Related Procedures  Follow Up In Dermatology - Established Patient    4. Seborrheic keratosis  Stuck on verrucous, tan-brown papules and plaques.      Seborrheic keratoses are common noncancerous (benign) growths of unknown cause seen in adults due to a thickening of an area of the top skin layer. Seborrheic keratoses may appear as if they are stuck on to the skin. They have distinct borders, and they may appear as papules (small, solid bumps) or plaques (solid, raised patches that are bigger than a thumbnail). They may be the same color as your skin, or they may be pink, light brown, darker brown, or very dark brown, or sometimes may appear black.    There is no way to prevent new seborrheic keratoses from forming. Seborrheic keratoses can be removed, but removal is considered a  cosmetic issue and is usually not covered by insurance.    PLAN  No treatment is needed unless there is irritation from clothing, such as itching or bleeding.  2.   Some lotions containing alpha hydroxy acids, salicylic acid, or urea may make the areas feel smoother with regular use but will not eliminate them.    Related Procedures  Follow Up In Dermatology - Established Patient    5. Lentigo  Scattered tan macules in sun-exposed areas.    A solar lentigo (plural, solar lentigines), also known as a sun-induced freckle or senile lentigo, is a dark (hyperpigmented) lesion caused by natural or artificial ultraviolet (UV) light. Solar lentigines may be single or multiple. This type of lentigo is different from a simple lentigo (lentigo simplex) because it is caused by exposure to UV light. Solar lentigines are benign, but they do indicate excessive sun exposure, a risk factor for the development of skin cancer.    To prevent solar lentigines, avoid exposure to sunlight in midday (10 AM to 3 PM), wear sun-protective clothing (tightly woven clothes and hats), and apply sunscreen (SPF 30 UVA and UVB block).    The present appearance of the lesion is not worrisome but it should continue to be observed and testing/treatment may be warranted if change occurs.    Related Procedures  Follow Up In Dermatology - Established Patient    6. Scar conditions and fibrosis of skin  Well healed scar at the site(s) of prior treatment with no evidence of recurrence.          The scar is clear, there is no evidence of recurrence.  The present appearance of the scar is not worrisome but it should continue to be observed and testing/treatment may be warranted if change occurs.      Related Procedures  Follow Up In Dermatology - Established Patient    7. History of nonmelanoma skin cancer    ABCDEs of melanoma and atypical moles were discussed with the patient.    Patient was instructed to perform monthly self skin examination.  We  recommended that the patient have regular full skin exams given an increased risk of subsequent skin cancers.    The patient was instructed to use sun protective behaviors including use of broad spectrum sunscreens and sun protective clothing to reduce risk of skin cancers.    Warning signs of non-melanoma skin cancer discussed.    Related Procedures  Follow Up In Dermatology - Established Patient    8. Skin changes due to chronic exposure to nonionizing radiation  Actinic changes in the form of freckles, lentigines and hyper/hypopigmentation     ABCDEs of melanoma and atypical moles were discussed with the patient.    Patient was instructed to perform monthly self skin examination.  We recommended that the patient have regular full skin exams given an increased risk of subsequent skin cancers.    The patient was instructed to use sun protective behaviors including use of broad spectrum sunscreens and sun protective clothing to reduce risk of skin cancers.    Warning signs of non-melanoma skin cancer discussed.    Related Procedures  Follow Up In Dermatology - Established Patient    9. Dermatitis  Scalp, Face, AC fossa/elbow area  Involving the scalp, right and left ear, beard area (including neck area- very mild today)  are red scaly flaky patches, scalp with mixture of flaky and semi pityriasis amiantacea scale.     Much better compared to last exam. Mild over all but still makes exam of face more challenging to identify AK. Will continue to monitor. May continue with HTC 2.5% cream BID PRN.     Related Procedures  Follow Up In Dermatology - Established Patient    Related Medications  clobetasol (Temovate) 0.05 % external solution  Apply to affected areas twice daily on the scalp for 2 weeks then daily x1 week then every other day x1 week then as needed. When using as needed, use less than 14 days per month.    hydrocortisone 2.5 % cream  Apply to affected areas twice daily to the face/arms for 2 weeks then daily  for 1 week then every other day for 1 week then stop. Then may used as needed when active. When using for maintenance, use less than 14 days per month.    10. Inflamed seborrheic keratosis  Left Wrist - Posterior  5 mm red scaly papule    Patient reports lesion present for 2-3 weeks, getting better. Some verrucal features noted, favoring more ISK over SCC at this time. He will monitor and notify me if the lesion starts to grow.     Related Procedures  Follow Up In Dermatology - Established Patient        Return in 6 months for routine skin check or return to clinic sooner if needed

## 2025-02-14 NOTE — PATIENT INSTRUCTIONS

## 2025-02-18 ENCOUNTER — TELEPHONE (OUTPATIENT)
Dept: DERMATOLOGY | Facility: CLINIC | Age: 80
End: 2025-02-18
Payer: MEDICARE

## 2025-02-18 DIAGNOSIS — C44.629 SCC (SQUAMOUS CELL CARCINOMA), HAND, LEFT: ICD-10-CM

## 2025-02-18 DIAGNOSIS — C44.212 BASAL CELL CARCINOMA (BCC) OF SKIN OF RIGHT EAR: ICD-10-CM

## 2025-02-18 DIAGNOSIS — C44.92 SCC (SQUAMOUS CELL CARCINOMA): ICD-10-CM

## 2025-02-18 NOTE — TELEPHONE ENCOUNTER
A and B are Skin cancer. Please place a referral for derm surgery for Mohs. Patient needs to return to clinic in 6 months for next routine skin check.

## 2025-02-19 ENCOUNTER — APPOINTMENT (OUTPATIENT)
Dept: PRIMARY CARE | Facility: CLINIC | Age: 80
End: 2025-02-19
Payer: MEDICARE

## 2025-02-19 VITALS
DIASTOLIC BLOOD PRESSURE: 80 MMHG | BODY MASS INDEX: 21.25 KG/M2 | TEMPERATURE: 97.3 F | HEART RATE: 53 BPM | OXYGEN SATURATION: 98 % | WEIGHT: 170 LBS | SYSTOLIC BLOOD PRESSURE: 130 MMHG

## 2025-02-19 DIAGNOSIS — I10 PRIMARY HYPERTENSION: ICD-10-CM

## 2025-02-19 DIAGNOSIS — I48.21 PERMANENT ATRIAL FIBRILLATION (MULTI): Primary | ICD-10-CM

## 2025-02-19 DIAGNOSIS — C61 PROSTATE CANCER (MULTI): ICD-10-CM

## 2025-02-19 DIAGNOSIS — E11.9 TYPE 2 DIABETES MELLITUS WITHOUT COMPLICATION, WITHOUT LONG-TERM CURRENT USE OF INSULIN (MULTI): ICD-10-CM

## 2025-02-19 DIAGNOSIS — I48.91 ATRIAL FIBRILLATION, UNSPECIFIED TYPE (MULTI): ICD-10-CM

## 2025-02-19 DIAGNOSIS — C44.300 SKIN CANCER OF FACE: ICD-10-CM

## 2025-02-19 DIAGNOSIS — H60.8X3 CHRONIC ECZEMATOUS OTITIS EXTERNA OF BOTH EARS: ICD-10-CM

## 2025-02-19 PROCEDURE — 1159F MED LIST DOCD IN RCRD: CPT | Performed by: FAMILY MEDICINE

## 2025-02-19 PROCEDURE — 1036F TOBACCO NON-USER: CPT | Performed by: FAMILY MEDICINE

## 2025-02-19 PROCEDURE — 99214 OFFICE O/P EST MOD 30 MIN: CPT | Performed by: FAMILY MEDICINE

## 2025-02-19 PROCEDURE — 3079F DIAST BP 80-89 MM HG: CPT | Performed by: FAMILY MEDICINE

## 2025-02-19 PROCEDURE — 1126F AMNT PAIN NOTED NONE PRSNT: CPT | Performed by: FAMILY MEDICINE

## 2025-02-19 PROCEDURE — 3075F SYST BP GE 130 - 139MM HG: CPT | Performed by: FAMILY MEDICINE

## 2025-02-19 PROCEDURE — 1157F ADVNC CARE PLAN IN RCRD: CPT | Performed by: FAMILY MEDICINE

## 2025-02-19 PROCEDURE — 1160F RVW MEDS BY RX/DR IN RCRD: CPT | Performed by: FAMILY MEDICINE

## 2025-02-19 RX ORDER — NEOMYCIN SULFATE, POLYMYXIN B SULFATE AND HYDROCORTISONE 10; 3.5; 1 MG/ML; MG/ML; [USP'U]/ML
3 SUSPENSION/ DROPS AURICULAR (OTIC) 2 TIMES DAILY PRN
Qty: 10 ML | Refills: 2 | Status: SHIPPED | OUTPATIENT
Start: 2025-02-19 | End: 2026-02-19

## 2025-02-19 ASSESSMENT — PATIENT HEALTH QUESTIONNAIRE - PHQ9
2. FEELING DOWN, DEPRESSED OR HOPELESS: NOT AT ALL
1. LITTLE INTEREST OR PLEASURE IN DOING THINGS: NOT AT ALL
SUM OF ALL RESPONSES TO PHQ9 QUESTIONS 1 AND 2: 0

## 2025-02-19 ASSESSMENT — PAIN SCALES - GENERAL: PAINLEVEL_OUTOF10: 0-NO PAIN

## 2025-02-19 NOTE — PROGRESS NOTES
"Subjective   Patient ID: Jhonny Baron is a 79 y.o. male who presents for Follow-up. 6 month recheck on chronic stable DM and AFIB.     Planning mohs to right ear -- BCC, and left hand SCC.    Objective   Visit Vitals  /80   Pulse 53   Temp 36.3 °C (97.3 °F)   Wt 77.1 kg (170 lb)   SpO2 98%   BMI 21.25 kg/m²   Smoking Status Former   BSA 2.02 m²      O: VSS AFEB Awake, Alert, NAD.  No intoxication, withdrawal, or sedation.  Chest CTA.  Heart RRR.  Ext no c/c/e.      Lab Results   Component Value Date    WBC 7.7 11/22/2024    HGB 13.4 (L) 11/22/2024    HCT 42.6 11/22/2024    MCV 95 11/22/2024     (H) 11/22/2024       Chemistry    Lab Results   Component Value Date/Time     11/22/2024 1614    K 4.5 11/22/2024 1614     11/22/2024 1614    CO2 29 11/22/2024 1614    BUN 32 (H) 11/22/2024 1614    CREATININE 1.23 11/22/2024 1614    Lab Results   Component Value Date/Time    CALCIUM 9.9 11/22/2024 1614    ALKPHOS 73 11/22/2024 1614    AST 22 11/22/2024 1614    ALT 14 11/22/2024 1614    BILITOT 1.1 11/22/2024 1614          Lab Results   Component Value Date    CHOL 120 08/27/2024    CHOL 94 08/15/2023    CHOL 111 07/21/2023     Lab Results   Component Value Date    HDL 37.7 08/27/2024    HDL 27.7 (A) 08/15/2023    HDL 31.5 (A) 07/21/2023     Lab Results   Component Value Date    LDLCALC 63 (L) 01/16/2018     Lab Results   Component Value Date    TRIG 97 08/15/2023    TRIG 155 (H) 07/21/2023    TRIG 93 02/22/2022     No components found for: \"CHOLHDL\"   Lab Results   Component Value Date    HGBA1C 6.3 (H) 08/27/2024       Assessment/Plan   Problem List Items Addressed This Visit       Atrial fibrillation (Multi) - Primary    Overview     Xarelto for DOAC.   F/U cardio Dr Darnell.         Current Assessment & Plan     Stable  OK to hold xarelto x 3 days prior to mohs surgery.          Relevant Medications    rivaroxaban (Xarelto) 15 mg tablet    Type 2 diabetes mellitus without complication, without " long-term current use of insulin (Multi)    Overview     Metformin started 7/2023 and tolerating well.            Current Assessment & Plan     Weight loss on Metformin started 7/2023  A1c improved to 6.3% on metformin. Continue this.         Hypertension    Current Assessment & Plan     Stable. Continue losratan 100, Lopressor 50 mg bid.          Prostate cancer (Multi)    Overview     12/2006 s/p prostatectomy ([facility])    1/2025 [facility] URO ([facility]) psa 0          Skin cancer of face    Overview     +BBC per path 2/14/2025         Current Assessment & Plan     Clear for surgery.  Low cardiopulmonary risk for low risk mohs surgery.  OK to hold xarelto x 3 days prior to surgery.          Chronic eczematous otitis externa of both ears    Overview     Start cortisporin as needed 2/19/25         Relevant Medications    neomycin-polymyxin-HC (Cortisporin) 3.5-10,000-1 mg/mL-unit/mL-% otic suspension

## 2025-02-19 NOTE — PATIENT INSTRUCTIONS
For Ear popping/pressure: use OTC saline nasal spray (Ocean, AYR, and Boris-Med are good brands)    For dry, scaly, red ear canals -- use cortisporin drops up to 4 drops twice a day as needed.     OK to stop Xarelto x 3 days prior to surgery if requested by Dr nOeil.

## 2025-02-19 NOTE — ASSESSMENT & PLAN NOTE
Clear for surgery.  Low cardiopulmonary risk for low risk mohs surgery.  OK to hold xarelto x 3 days prior to surgery.

## 2025-02-25 NOTE — PROGRESS NOTES
Subjective      Chief Complaint   Patient presents with    Follow-up     6 month follow up  POST Fall (slip)          He does have a history atrial fibrillation as well as hypertension.  He is on Xarelto for embolic protection   He says is active.  He is not complaining of chest discomfort.  NO PND or orthopnea.  The legs are not swelling on him.  He does not complain of palpitations.  He has rarely felt the afib  The BP is doing well           Review of Systems   Constitutional: Negative. Negative for chills and fever.   HENT: Negative.     Eyes: Negative.    Respiratory: Negative.  Negative for cough.    Endocrine: Negative.    Skin: Negative.    Musculoskeletal: Negative.  Negative for falls.   Gastrointestinal: Negative.    Genitourinary: Negative.    Neurological: Negative.    All other systems reviewed and are negative.       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    Atrial fibrillation (Multi) 07/21/2023    Blepharitis 07/21/2023    Cataract 07/21/2023    Chronic kidney disease, stage 3 (Multi) 07/21/2023    Contact dermatitis and eczema 07/21/2023    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 07/21/2023    ED (erectile dysfunction) 07/21/2023    Eyestrain, bilateral 07/21/2023    High cholesterol 07/21/2023    Type 2 diabetes mellitus without complication, without long-term current use of insulin (Multi) 07/21/2023    Hypertension 07/21/2023    Insomnia 07/21/2023    Nuclear sclerosis of both eyes 07/21/2023    Osteoarthritis 07/21/2023    Numbness and tingling 07/21/2023    Pinguecula  07/21/2023    Prostate cancer (Multi) 07/21/2023    PVD (posterior vitreous detachment), both eyes 07/21/2023    Refractive error 07/21/2023    OLIVE (subconjunctival hemorrhage) 07/21/2023    Skin cancer of face 07/21/2023    Vitreous floaters 07/21/2023    Medicare annual wellness visit, subsequent 07/21/2023    Atopic dermatitis, unspecified 03/28/2023    Chronic venous insufficiency 03/28/2023    First degree atrioventricular block 09/13/2023    Hemangioma of skin and subcutaneous tissue 03/28/2023    Melanocytic nevi of trunk 03/28/2023    Neoplasm of uncertain behavior of skin 03/28/2023    Actinic keratosis 03/28/2023    Personal history of other malignant neoplasm of skin 03/28/2023    Dermatitis, unspecified 03/28/2023    Psoriasis 09/13/2023    Scar condition and fibrosis of skin 03/28/2023    Right bundle branch block 09/13/2023    Rosacea, unspecified 03/28/2023    Presbyopia of both eyes 10/25/2023    Myopia of both eyes 10/25/2023    Non-recurrent unilateral inguinal hernia without obstruction or gangrene 04/05/2024    Ataxia 04/16/2024    Gait instability 08/27/2024    Chronic eczematous otitis externa of both ears 02/19/2025    Aortic valve disease 02/27/2025     Resolved Ambulatory Problems     Diagnosis Date Noted    Left inguinal hernia 03/22/2024     Past Medical History:   Diagnosis Date    A-fib (Multi)     Alcohol abuse, uncomplicated     Anxiety disorder, unspecified     Dermatochalasis of unspecified eye, unspecified eyelid 01/06/2020    Dermatochalasis of unspecified eye, unspecified eyelid 01/06/2020    Dermatochalasis of unspecified eye, unspecified eyelid 01/06/2020    DM type 2 (diabetes mellitus, type 2) (Multi)     HTN (hypertension)     Male erectile dysfunction, unspecified     Other conditions influencing health status     Other specified diseases of liver     Palpitations     Personal history of other diseases of male genital organs     Personal history of other diseases of the  "circulatory system     Personal history of other endocrine, nutritional and metabolic disease     Pinguecula, right eye 01/06/2020    Pinguecula, right eye 01/06/2020    Pinguecula, right eye 01/06/2020    Pure hypercholesterolemia, unspecified 07/22/2022        Visit Vitals  /70 (BP Location: Right arm, Patient Position: Sitting, BP Cuff Size: Adult)   Pulse 65   Resp 16   Wt 77.6 kg (171 lb)   SpO2 98%   BMI 21.37 kg/m²   Smoking Status Former   BSA 2.03 m²        Objective     Constitutional:       Appearance: Healthy appearance.   Eyes:      Pupils: Pupils are equal, round, and reactive to light.   Neck:      Vascular: No JVR. JVD normal.   Pulmonary:      Effort: Pulmonary effort is normal.      Breath sounds: Normal breath sounds.   Cardiovascular:      PMI at left midclavicular line. Normal rate. Irregularly irregular rhythm. Normal S1. Normal S2.       Murmurs: There is a grade 2/6 harsh midsystolic murmur at the URSB, radiating to the neck.      No gallop.  No click. No rub.   Pulses:     Intact distal pulses.   Edema:     Peripheral edema absent.   Abdominal:      Palpations: Abdomen is soft.      Tenderness: There is no abdominal tenderness.   Musculoskeletal: Normal range of motion.      Extremities: No clubbing present.Skin:     General: Skin is warm and dry.   Neurological:      General: No focal deficit present.            Lab Review:         Lab Results   Component Value Date    CHOL 120 08/27/2024    CHOL 94 08/15/2023    CHOL 111 07/21/2023     Lab Results   Component Value Date    HDL 37.7 08/27/2024    HDL 27.7 (A) 08/15/2023    HDL 31.5 (A) 07/21/2023     Lab Results   Component Value Date    LDLCALC 63 (L) 01/16/2018     Lab Results   Component Value Date    TRIG 97 08/15/2023    TRIG 155 (H) 07/21/2023    TRIG 93 02/22/2022     No components found for: \"CHOLHDL\"     Assessment/Plan     Atrial fibrillation (Multi)  The afib is doing well and is controlled    Aortic valve disease  He has " not had the aortic valve checked and will get an echo.  Sounds like he has mild aortic stenosis    High cholesterol  The BP is controlled    Hypertension  The BP is doing well

## 2025-02-27 ENCOUNTER — OFFICE VISIT (OUTPATIENT)
Dept: CARDIOLOGY | Facility: CLINIC | Age: 80
End: 2025-02-27
Payer: MEDICARE

## 2025-02-27 VITALS
SYSTOLIC BLOOD PRESSURE: 110 MMHG | BODY MASS INDEX: 21.37 KG/M2 | RESPIRATION RATE: 16 BRPM | HEART RATE: 65 BPM | WEIGHT: 171 LBS | OXYGEN SATURATION: 98 % | DIASTOLIC BLOOD PRESSURE: 70 MMHG

## 2025-02-27 DIAGNOSIS — I35.1 AORTIC EJECTION MURMUR: Primary | ICD-10-CM

## 2025-02-27 DIAGNOSIS — I10 PRIMARY HYPERTENSION: ICD-10-CM

## 2025-02-27 DIAGNOSIS — I48.21 PERMANENT ATRIAL FIBRILLATION (MULTI): ICD-10-CM

## 2025-02-27 DIAGNOSIS — I35.9 AORTIC VALVE DISEASE: ICD-10-CM

## 2025-02-27 DIAGNOSIS — E78.00 HIGH CHOLESTEROL: ICD-10-CM

## 2025-02-27 PROCEDURE — 99213 OFFICE O/P EST LOW 20 MIN: CPT | Performed by: INTERNAL MEDICINE

## 2025-02-27 PROCEDURE — 1036F TOBACCO NON-USER: CPT | Performed by: INTERNAL MEDICINE

## 2025-02-27 PROCEDURE — 3078F DIAST BP <80 MM HG: CPT | Performed by: INTERNAL MEDICINE

## 2025-02-27 PROCEDURE — 1159F MED LIST DOCD IN RCRD: CPT | Performed by: INTERNAL MEDICINE

## 2025-02-27 PROCEDURE — 3074F SYST BP LT 130 MM HG: CPT | Performed by: INTERNAL MEDICINE

## 2025-02-27 PROCEDURE — 1157F ADVNC CARE PLAN IN RCRD: CPT | Performed by: INTERNAL MEDICINE

## 2025-02-27 PROCEDURE — 1126F AMNT PAIN NOTED NONE PRSNT: CPT | Performed by: INTERNAL MEDICINE

## 2025-02-27 ASSESSMENT — ENCOUNTER SYMPTOMS
CHILLS: 0
CONSTITUTIONAL NEGATIVE: 1
RESPIRATORY NEGATIVE: 1
LOSS OF SENSATION IN FEET: 0
ENDOCRINE NEGATIVE: 1
GASTROINTESTINAL NEGATIVE: 1
FALLS: 0
NEUROLOGICAL NEGATIVE: 1
EYES NEGATIVE: 1
MUSCULOSKELETAL NEGATIVE: 1
COUGH: 0
DEPRESSION: 0
FEVER: 0
OCCASIONAL FEELINGS OF UNSTEADINESS: 0

## 2025-02-27 ASSESSMENT — LIFESTYLE VARIABLES
HOW MANY STANDARD DRINKS CONTAINING ALCOHOL DO YOU HAVE ON A TYPICAL DAY: 1 OR 2
HAVE YOU OR SOMEONE ELSE BEEN INJURED AS A RESULT OF YOUR DRINKING: NO
AUDIT TOTAL SCORE: 3
AUDIT-C TOTAL SCORE: 3
HAS A RELATIVE, FRIEND, DOCTOR, OR ANOTHER HEALTH PROFESSIONAL EXPRESSED CONCERN ABOUT YOUR DRINKING OR SUGGESTED YOU CUT DOWN: NO
HOW OFTEN DO YOU HAVE SIX OR MORE DRINKS ON ONE OCCASION: NEVER
SKIP TO QUESTIONS 9-10: 1
HOW OFTEN DO YOU HAVE A DRINK CONTAINING ALCOHOL: 2-3 TIMES A WEEK

## 2025-02-27 ASSESSMENT — PAIN SCALES - GENERAL: PAINLEVEL_OUTOF10: 0-NO PAIN

## 2025-02-27 NOTE — ASSESSMENT & PLAN NOTE
He has not had the aortic valve checked and will get an echo.  Sounds like he has mild aortic stenosis

## 2025-03-04 ENCOUNTER — APPOINTMENT (OUTPATIENT)
Dept: DERMATOLOGY | Facility: CLINIC | Age: 80
End: 2025-03-04
Payer: MEDICARE

## 2025-03-04 VITALS — SYSTOLIC BLOOD PRESSURE: 177 MMHG | DIASTOLIC BLOOD PRESSURE: 78 MMHG | HEART RATE: 60 BPM

## 2025-03-04 DIAGNOSIS — C44.212 BASAL CELL CARCINOMA (BCC) OF SKIN OF RIGHT EAR: ICD-10-CM

## 2025-03-04 PROCEDURE — 17312 MOHS ADDL STAGE: CPT | Performed by: DERMATOLOGY

## 2025-03-04 PROCEDURE — 99214 OFFICE O/P EST MOD 30 MIN: CPT | Performed by: DERMATOLOGY

## 2025-03-04 PROCEDURE — 17311 MOHS 1 STAGE H/N/HF/G: CPT | Performed by: DERMATOLOGY

## 2025-03-04 NOTE — PROGRESS NOTES
Office Visit Note  Date: 3/4/2025  Surgeon:  Rosalie Oneil MD  Office Location:  [address] [facility]  [address]  [address]  [facility] [id number]  Dept: [phone]  Dept Fax: [phone]  Referring Provider: Giancarlo Anguiano, APRN-CNP  [address]  [address]  [place] [address]    Subjective   Jhonny Baron is a 79 y.o. male who presents for the following: MOHS Surgery    According to the patient, the lesion has been present for approximately greater than 1 year at the time of diagnosis.  The lesion is painful.  The lesion has not been treated previously.    The patient does not have a pacemaker / defibrillator.  The patient does not have a heart valve / joint replacement.    The patient is on blood thinners.  The patient does not have a history of hepatitis B or C.  The patient does not have a history of HIV.  The patient does not have a history of immunosuppression (e.g. organ transplantation, malignancy, medications)    Review of Systems:  No other skin or systemic complaints other than what is documented elsewhere in the note.    MEDICAL HISTORY: clinically relevant history including significant past medical history, medications and allergies was reviewed and documented in Epic.    Objective   Well appearing patient in no apparent distress; mood and affect are within normal limits.  Vital signs: See record.  Noted on the Right Mid Helix  Is a 0.5 x 0.6 cm scar    The patient confirmed the identified site.    Discussion:  The nature of the diagnosis was explained. The lesion is a skin cancer.  It has a risk of local growth and distant spread. The condition is associated with sun exposure.  Warning signs of non-melanoma skin cancer discussed. Patient was instructed to perform monthly self skin examination.  We recommended that the patient have regular full skin exams given an increased risk of subsequent skin cancers. The patient was instructed to use sun protective behaviors  including use of broad spectrum sunscreens and sun protective clothing to reduce risk of skin cancers.      Risks, benefits, side effects of Mohs surgery were discussed with patient and the patient voiced understanding.  It was explained that even though the cure rate of Mohs is very high it is not 100%. Risks of surgery including but not limited to bleeding, infection, numbness, nerve damage, and scar were reviewed.  Discussion included wound care requirements, activity restrictions, likely scar outcome and time to heal.     After Mohs surgery, the defect may need to be repaired surgically and the scar may be longer than the original lesion.  Reconstruction options, risks, and benefits were reviewed including second intention healing, linear repair (4-1 ratio was explained), local flaps, skin grafts, cartilage grafts and interpolation flaps (the need for multiple surgeries was explained). Possible outcomes were reviewed including likely scar appearance, failure of flap survival, infection, bleeding and the need for revision surgery.     The pathology was reviewed, the photograph was reviewed, and the referring physician's note was reviewed.    Patient elected for Mohs surgery.

## 2025-03-04 NOTE — PROGRESS NOTES
Mohs Surgery Operative Note    Date of Surgery:  3/4/2025  Surgeon:  Rosalie Oneil MD  Office Location:  [address] [facility]  [address]  [address]  [facility] [address]  Dept: [phone]  Dept Fax: [phone]  Referring Provider: Giancarlo Anguiano, APRN-CNP  [address] [facility]  [address]  [place] [address]      Assessment/Plan   Pre-procedure:   Obtained informed consent: written from patient  The surgical site was identified and confirmed with the patient.     Intra-operative:   Audible time out called at : 8:28 AM 03/04/25  by: Felicia Dover MA   Verified patient name, birthdate, site, specimen bottle label & requisition.    The planned procedure(s) was again reviewed with the patient. The risks of bleeding, infection, nerve damage and scarring were reviewed. Written authorization was obtained. The patient identity, surgical site, and planned procedure(s) were verified. The provider acted as both surgeon and pathologist.     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  Right Mid Bullard  Mohs surgery  Consent obtained: written    Universal Protocol:  Procedure explained and questions answered to patient or proxy's satisfaction: Yes    Test results available and properly labeled: Yes    Pathology report reviewed: Yes    External notes reviewed: Yes    Photo or diagram used for site identification: Yes    Site/side marked: Yes    Slide independently reviewed by Mohs surgeon: Yes    Immediately prior to procedure a time out was called: Yes    Patient identity confirmed: verbally with patient  Preparation: Patient was prepped and draped in usual sterile fashion      Anticoagulation:  Is the patient taking prescription anticoagulant and/or aspirin prescribed/recommended by a physician? Yes    Was the anticoagulation regimen changed prior to Mohs? No      Anesthesia:  Anesthesia method: local infiltration  Local anesthetic: 1.5% lido with epi.    Procedure Details:  Case ID  Number: [id number]  Biopsy accession number: [id number]  Date of biopsy: 2/14/2025  Pre-Op diagnosis: basal cell carcinoma  BCC subtype: nodular  Surgical site (from skin exam): Right Mid Helix  Pre-operative length (cm): 0.5  Pre-operative width (cm): 0.6  Indications for Mohs surgery: anatomic location where tissue conservation is critical  Previously treated? No      Micrographic Surgery Details:  Post-operative length (cm): 1.5  Post-operative width (cm): 1  Number of Mohs stages: 2    Stage 1     Comments: The patient was brought into the operating room and placed in the procedure chair in the appropriate position.  The area positive by previous biopsy was identified and confirmed with the patient. The area of clinically obvious tumor was debulked using a curette and/or scalpel as needed. An incision was made following the Mohs approach through the skin. The specimen was taken to the lab, divided into 1 piece(s) and appropriately chromacoded and processed.  Tumor was seen on the deep margins as indicated on the on the Mohs map.  Infiltrative basal cell carcinoma. Histologic examination revealed thin strands and small-angulated aggregates of atypical basaloid cells.     Tumor features identified on Mohs section: basal carcinoma      Depth of invasion: dermis    Stage 2     Comments: The area of positivity as noted on the Mohs map in the previous stage was identified and removed using the Mohs technique. The specimen was taken to the lab and appropriately chromacoded and processed in 1 piece(s).     Tumor features identified on Mohs section: no tumor identified    Depth of defect: subcutaneous fat    Patient tolerance of procedure: tolerated well, no immediate complications    Reconstruction:  Was the defect reconstructed?: No   Repair: After a discussion with the patient regarding the options for wound closure, a decision was made to proceed with second intention healing.  Dressing/Follow-up: Surgifoam was placed  in the wound. A pressure dressing was placed to help stabilize the wound and to minimize the risk of postoperative bleeding. Wound care was discussed, and the patient was given written post-operative wound care instructions.  Hemostasis achieved with: pressure, Gelfoam and electrodesiccation  Outcome: patient tolerated procedure well with no complications    Post-procedure details: sterile dressing applied and wound care instructions given    Dressing type: pressure dressing and Gelfoam    Additional details:  Repair: After a discussion with the patient regarding the options for wound closure, a decision was made to proceed with second intention healing.  Dressing F/U: Surgifoam was placed in the wound. A pressure dressing was placed to help stabilize the wound and to minimize the risk of postoperative bleeding. Wound care was discussed, and the patient was given written post-operative wound care instructions.    The final repair measured 1.5 x 1 cm          Wound care was discussed, and the patient was given written post-operative wound care instructions.      The patient will follow up with Rosalie Oneil MD as needed for any post operative problems or concerns, and will follow up with their primary dermatologist as scheduled.

## 2025-03-11 ENCOUNTER — APPOINTMENT (OUTPATIENT)
Dept: DERMATOLOGY | Facility: CLINIC | Age: 80
End: 2025-03-11
Payer: MEDICARE

## 2025-03-11 VITALS — HEART RATE: 54 BPM | SYSTOLIC BLOOD PRESSURE: 193 MMHG | DIASTOLIC BLOOD PRESSURE: 92 MMHG

## 2025-03-11 DIAGNOSIS — C44.629 SCC (SQUAMOUS CELL CARCINOMA), HAND, LEFT: ICD-10-CM

## 2025-03-11 PROCEDURE — 13132 CMPLX RPR F/C/C/M/N/AX/G/H/F: CPT | Performed by: DERMATOLOGY

## 2025-03-11 PROCEDURE — 17311 MOHS 1 STAGE H/N/HF/G: CPT | Performed by: DERMATOLOGY

## 2025-03-11 NOTE — PROGRESS NOTES
Mohs Surgery Operative Note    Date of Surgery:  3/11/2025  Surgeon:  Rosalie Oneil MD  Office Location:  [address] [facility]  [address]  [address]  [facility] [address]  Dept: [phone]  Dept Fax: [phone]  Referring Provider: Giancarlo Anguiano, APRN-CNP  [address] [facility] [address]  [place] [address]      Assessment/Plan   Pre-procedure:   Obtained informed consent: written from patient  The surgical site was identified and confirmed with the patient.     Intra-operative:   Audible time out called at : 8:28AM 03/11/25  by: Jasmin Aguiar RN   Verified patient name, birthdate, site, specimen bottle label & requisition.    The planned procedure(s) was again reviewed with the patient. The risks of bleeding, infection, nerve damage and scarring were reviewed. Written authorization was obtained. The patient identity, surgical site, and planned procedure(s) were verified. The provider acted as both surgeon and pathologist.     SCC (squamous cell carcinoma), hand, left  Left Dorsal Hand  Mohs surgery  Consent obtained: written    Universal Protocol:  Procedure explained and questions answered to patient or proxy's satisfaction: Yes    Test results available and properly labeled: Yes    Pathology report reviewed: Yes    External notes reviewed: Yes    Photo or diagram used for site identification: Yes    Site/side marked: Yes    Slide independently reviewed by Mohs surgeon: Yes    Immediately prior to procedure a time out was called: Yes    Patient identity confirmed: verbally with patient  Preparation: Patient was prepped and draped in usual sterile fashion      Anticoagulation:  Is the patient taking prescription anticoagulant and/or aspirin prescribed/recommended by a physician? Yes    Was the anticoagulation regimen changed prior to Mohs? No      Anesthesia:  Anesthesia method: local infiltration  Local anesthetic: lidocaine 1.5% WITH epi     Procedure Details:  Case ID Number:  [id number]  Biopsy accession number: [id number]  Date of biopsy: 2/14/2025  Pre-Op diagnosis: squamous cell carcinoma  Surgical site (from skin exam): Left Dorsal Hand  Pre-operative length (cm): 2.3  Pre-operative width (cm): 2.2  Indications for Mohs surgery: anatomic location where tissue conservation is critical    Micrographic Surgery Details:  Post-operative length (cm): 2.3  Post-operative width (cm): 2.3  Number of Mohs stages: 1    Stage 1  Comments: The patient was brought into the operating room and placed in the procedure chair in the appropriate position.  The area positive by previous biopsy was identified and confirmed with the patient. The area of clinically obvious tumor was debulked using a curette and/or scalpel as needed. An incision was made following the Mohs approach through the skin. The specimen was taken to the lab, divided into 1 piece(s) and appropriately chromacoded and processed.  Tumor features identified on Mohs section: no tumor identified  Depth of defect: subcutaneous fat    Patient tolerance of procedure: tolerated well, no immediate complications    Reconstruction:  Was the defect reconstructed? Yes    Was reconstruction performed by the same Mohs surgeon? Yes    Setting of reconstruction: outpatient office  When was reconstruction performed? same day  Type of reconstruction: linear  Linear reconstruction: complex  Length of linear repair (cm): 4.3  Subcutaneous Layers (Deep Stitches)   Suture size:  3-0  Suture type:  Vicryl  Stitches:  Buried vertical mattress  Fine/surface layer approximation (top stitches)   Epidermal/Superficial suture size:  5-0  Epidermal/Superficial suture type:  Fast-absorbing gut  Stitches: simple running    Hemostasis achieved with: electrodesiccation  Outcome: patient tolerated procedure well with no complications    Post-procedure details: sterile dressing applied and wound care instructions given    Dressing type: Coban, pressure dressing, petrolatum,  Hypafix and Telfa pad      Complex Linear Repair - Wide Undermining:  Given the location and size of the defect, it was determined that a complex layered linear closure was required to restore normal anatomy and function. The repair was considered complex because extensive undermining was required and performed. The amount of undermining performed was greater than the maximum width of the defect as measured perpendicular to the closure line along at least one entire edge of the defect. Standing cutaneous cones were removed using Burow's triangles. The wound edges were brought into close approximation with buried vertical mattress sutures. The remainder of the wound was then closed with epidermal top sutures.    The final repair measured 4.3 cm          Wound care was discussed, and the patient was given written post-operative wound care instructions.      The patient will follow up with Rosalie Oneil MD as needed for any post operative problems or concerns, and will follow up with their primary dermatologist as scheduled.

## 2025-03-11 NOTE — PROGRESS NOTES
Office Visit Note  Date: 3/11/2025  Surgeon:  Rosalie Oneil MD  Office Location:  [address] [facility]  [address]  [address]  [facility] [address]  Dept: [phone]  Dept Fax: [phone]  Referring Provider: Giancarlo Anguiano, APRN-CNP  [address]  [address]  [place] [address]    Subjective   Jhonny Baron is a 79 y.o. male who presents for the following: MOHS Surgery    According to the patient, the lesion has been present for approximately 6 months at the time of diagnosis.  The lesion is not causing symptoms.  The lesion has not been treated previously.    The patient does not have a pacemaker / defibrillator.  The patient does not have a heart valve / joint replacement.    The patient is on blood thinners.  The patient does not have a history of hepatitis B or C.  The patient does not have a history of HIV.  The patient does have a history of immunosuppression (e.g. organ transplantation, malignancy, medications)    Review of Systems:  No other skin or systemic complaints other than what is documented elsewhere in the note.    MEDICAL HISTORY: clinically relevant history including significant past medical history, medications and allergies was reviewed and documented in Epic.    Objective   Well appearing patient in no apparent distress; mood and affect are within normal limits.  Vital signs: See record.  Noted on the Left Dorsal Hand  Is a 2.3 x 2.2 cm scar      The patient confirmed the identified site.    Discussion:  The nature of the diagnosis was explained. The lesion is a skin cancer.  It has a risk of local growth and distant spread. The condition is associated with sun exposure.  Warning signs of non-melanoma skin cancer discussed. Patient was instructed to perform monthly self skin examination.  We recommended that the patient have regular full skin exams given an increased risk of subsequent skin cancers. The patient was instructed to use sun protective  behaviors including use of broad spectrum sunscreens and sun protective clothing to reduce risk of skin cancers.      Risks, benefits, side effects of Mohs surgery were discussed with patient and the patient voiced understanding.  It was explained that even though the cure rate of Mohs is very high it is not 100%. Risks of surgery including but not limited to bleeding, infection, numbness, nerve damage, and scar were reviewed.  Discussion included wound care requirements, activity restrictions, likely scar outcome and time to heal.     After Mohs surgery, the defect may need to be repaired surgically and the scar may be longer than the original lesion.  Reconstruction options, risks, and benefits were reviewed including second intention healing, linear repair (4-1 ratio was explained), local flaps, skin grafts, cartilage grafts and interpolation flaps (the need for multiple surgeries was explained). Possible outcomes were reviewed including likely scar appearance, failure of flap survival, infection, bleeding and the need for revision surgery.     The pathology was reviewed, the photograph was reviewed, and the referring physician's note was reviewed.    Patient elected for Mohs surgery.

## 2025-03-27 ENCOUNTER — HOSPITAL ENCOUNTER (OUTPATIENT)
Dept: CARDIOLOGY | Facility: HOSPITAL | Age: 80
Discharge: HOME | End: 2025-03-27
Payer: MEDICARE

## 2025-03-27 DIAGNOSIS — I35.1 AORTIC EJECTION MURMUR: ICD-10-CM

## 2025-03-27 LAB
AORTIC VALVE MEAN GRADIENT: 23 MMHG
AORTIC VALVE PEAK VELOCITY: 3.18 M/S
AV PEAK GRADIENT: 40 MMHG
AVA (PEAK VEL): 0.8 CM2
AVA (VTI): 0.76 CM2
EJECTION FRACTION APICAL 4 CHAMBER: 49.1
EJECTION FRACTION: 58 %
LEFT ATRIUM VOLUME AREA LENGTH INDEX BSA: 61.1 ML/M2
LEFT VENTRICLE INTERNAL DIMENSION DIASTOLE: 4.38 CM (ref 3.5–6)
LEFT VENTRICULAR OUTFLOW TRACT DIAMETER: 2 CM
LV EJECTION FRACTION BIPLANE: 52 %
RIGHT VENTRICLE FREE WALL PEAK S': 11.6 CM/S
RIGHT VENTRICLE PEAK SYSTOLIC PRESSURE: 58.7 MMHG
TRICUSPID ANNULAR PLANE SYSTOLIC EXCURSION: 1.9 CM

## 2025-03-27 PROCEDURE — 93306 TTE W/DOPPLER COMPLETE: CPT | Performed by: INTERNAL MEDICINE

## 2025-03-27 PROCEDURE — 93306 TTE W/DOPPLER COMPLETE: CPT

## 2025-05-05 DIAGNOSIS — E78.00 HIGH CHOLESTEROL: ICD-10-CM

## 2025-05-06 RX ORDER — GEMFIBROZIL 600 MG/1
600 TABLET, FILM COATED ORAL 2 TIMES DAILY
Qty: 180 TABLET | Refills: 3 | Status: SHIPPED | OUTPATIENT
Start: 2025-05-06 | End: 2026-05-06

## 2025-05-07 ENCOUNTER — TELEPHONE (OUTPATIENT)
Dept: PRIMARY CARE | Facility: CLINIC | Age: 80
End: 2025-05-07
Payer: MEDICARE

## 2025-05-07 NOTE — TELEPHONE ENCOUNTER
Patient is a little confused after his visit with [facility](?) he would like to talk with Dr Arango

## 2025-05-13 NOTE — TELEPHONE ENCOUNTER
Pt stated he misplaced the paper work and stated he did not know who he saw and I told the pt to call [facility] and see if they can send out or the pt can  the reprinted paper work because that is not our office that handles the paperwork and we would not be able to help with that.

## 2025-06-04 ENCOUNTER — APPOINTMENT (OUTPATIENT)
Dept: PRIMARY CARE | Facility: CLINIC | Age: 80
End: 2025-06-04
Payer: MEDICARE

## 2025-06-04 VITALS
SYSTOLIC BLOOD PRESSURE: 120 MMHG | TEMPERATURE: 98.2 F | WEIGHT: 173 LBS | HEART RATE: 66 BPM | BODY MASS INDEX: 21.51 KG/M2 | OXYGEN SATURATION: 98 % | DIASTOLIC BLOOD PRESSURE: 60 MMHG | HEIGHT: 75 IN

## 2025-06-04 DIAGNOSIS — G31.84 MILD COGNITIVE IMPAIRMENT: ICD-10-CM

## 2025-06-04 DIAGNOSIS — R73.03 PREDIABETES: ICD-10-CM

## 2025-06-04 DIAGNOSIS — L25.9 CONTACT DERMATITIS AND ECZEMA: ICD-10-CM

## 2025-06-04 DIAGNOSIS — L30.9 DERMATITIS: ICD-10-CM

## 2025-06-04 DIAGNOSIS — I48.21 PERMANENT ATRIAL FIBRILLATION (MULTI): Primary | ICD-10-CM

## 2025-06-04 DIAGNOSIS — R45.1 TERMINAL RESTLESSNESS: ICD-10-CM

## 2025-06-04 PROCEDURE — 3074F SYST BP LT 130 MM HG: CPT | Performed by: FAMILY MEDICINE

## 2025-06-04 PROCEDURE — 1126F AMNT PAIN NOTED NONE PRSNT: CPT | Performed by: FAMILY MEDICINE

## 2025-06-04 PROCEDURE — 99214 OFFICE O/P EST MOD 30 MIN: CPT | Performed by: FAMILY MEDICINE

## 2025-06-04 PROCEDURE — 3078F DIAST BP <80 MM HG: CPT | Performed by: FAMILY MEDICINE

## 2025-06-04 PROCEDURE — 1036F TOBACCO NON-USER: CPT | Performed by: FAMILY MEDICINE

## 2025-06-04 RX ORDER — METFORMIN HYDROCHLORIDE 500 MG/1
500 TABLET, EXTENDED RELEASE ORAL
Qty: 90 TABLET | Refills: 3 | Status: SHIPPED | OUTPATIENT
Start: 2025-06-04 | End: 2026-06-04

## 2025-06-04 RX ORDER — LORAZEPAM 1 MG/1
TABLET ORAL
Qty: 1 TABLET | Refills: 0 | Status: SHIPPED | OUTPATIENT
Start: 2025-06-04

## 2025-06-04 RX ORDER — HYDROCORTISONE 25 MG/G
CREAM TOPICAL
Qty: 454 G | Refills: 1 | Status: SHIPPED | OUTPATIENT
Start: 2025-06-04

## 2025-06-04 ASSESSMENT — PATIENT HEALTH QUESTIONNAIRE - PHQ9: 1. LITTLE INTEREST OR PLEASURE IN DOING THINGS: NOT AT ALL

## 2025-06-04 ASSESSMENT — PAIN SCALES - GENERAL: PAINLEVEL_OUTOF10: 0-NO PAIN

## 2025-06-04 NOTE — ASSESSMENT & PLAN NOTE
Check MRI. Check labs. Recheck MMSE in September with new recall words.     If decline will send for cognitive testing.

## 2025-06-04 NOTE — PATIENT INSTRUCTIONS
For facial rash -- continue hydrocortisone 2.5% cream up to twice a day (including after shaving)    STOP aftershave tonics    Avoid shaving every day -- can take up to a week off if ok with Hallie.

## 2025-06-04 NOTE — PROGRESS NOTES
"Subjective   Patient ID: Jhonny Baron is a 79 y.o. male who presents for Rash (Past week-itching) and memory concern (Past year, seemingly forgetful).      Objective   Visit Vitals  /60   Pulse 66   Temp 36.8 °C (98.2 °F)   Ht 1.905 m (6' 3\")   Wt 78.5 kg (173 lb)   SpO2 98%   BMI 21.62 kg/m²   Smoking Status Former   BSA 2.04 m²      O: VSS AFEB Awake, Alert, NAD.  No intoxication, withdrawal, or sedation.  Chest CTA.  Heart irreg irreg  2/6 JUSTIN.  Ext no c/c/e.      Last Labs:     CMP:   Lab Results   Component Value Date    CALCIUM 9.9 11/22/2024    CALCIUM 9.6 08/27/2024    PROT 7.3 11/22/2024    PROT 7.2 08/27/2024    ALBUMIN 4.9 11/22/2024    ALBUMIN 4.6 08/27/2024    AST 22 11/22/2024    AST 22 08/27/2024    ALKPHOS 73 11/22/2024    ALKPHOS 70 08/27/2024    BILITOT 1.1 11/22/2024    BILITOT 1.1 08/27/2024     CBC:   Lab Results   Component Value Date    WBC 7.7 11/22/2024    WBC 7.7 08/27/2024    HGB 13.4 (L) 11/22/2024    HGB 13.4 (L) 08/27/2024    HCT 42.6 11/22/2024    HCT 41.8 08/27/2024    MCV 95 11/22/2024    MCV 93 08/27/2024     (H) 11/22/2024     (H) 08/27/2024     A1C:   Lab Results   Component Value Date    HGBA1C 6.3 (H) 08/27/2024    HGBA1C 5.9 (H) 11/22/2023     LIPID PANEL:   Lab Results   Component Value Date    CHOL 120 08/27/2024    CHOL 94 08/15/2023    CHOL 111 07/21/2023    TRIG 97 08/15/2023    TRIG 155 (H) 07/21/2023    HDL 37.7 08/27/2024    HDL 27.7 (A) 08/15/2023    HDL 31.5 (A) 07/21/2023    CHHDL 3.2 08/27/2024    CHHDL 3.4 08/15/2023    CHHDL 3.5 07/21/2023    CHHDL 3.8 01/16/2018    LDLF 47 08/15/2023    LDLF 49 07/21/2023    VLDL 19 08/15/2023    VLDL 31 07/21/2023     TSH:   Lab Results   Component Value Date    TSH 1.77 11/22/2024    TSH 1.20 08/27/2024     PSA:   Lab Results   Component Value Date    PSA <0.10 08/27/2024    PSA <0.10 07/21/2023       Assessment/Plan   Problem List Items Addressed This Visit       Atrial fibrillation (Multi) - Primary    " Overview   Xarelto for DOAC.   F/U cardio Dr Darnell -- retiring.  Needs new cardio.         Current Assessment & Plan   The afib is doing well and is controlled         Contact dermatitis and eczema    Overview   2/2025 DERM (Silvio)          Current Assessment & Plan   For facial rash -- continue hydrocortisone 2.5% cream up to twice a day (including after shaving)    STOP aftershave tonics    Avoid shaving every day -- can take up to a week off if ok with Hallie.              Mild cognitive impairment    Overview   Onset 2025  MMSE 28/30 missed just recall 2/3.          Current Assessment & Plan   Check MRI. Check labs. Recheck MMSE in September with new recall words.     If decline will send for cognitive testing.          Relevant Orders    Comprehensive metabolic panel    CBC    MR brain wo IV contrast    Tsh With Reflex To Free T4 If Abnormal    Sedimentation Rate    Syphilis Screen with Reflex     Other Visit Diagnoses         Prediabetes        Relevant Medications    metFORMIN  mg 24 hr tablet      Dermatitis        Relevant Medications    hydrocortisone 2.5 % cream      Terminal restlessness        Relevant Medications    LORazepam (Ativan) 1 mg tablet

## 2025-06-05 LAB
ALBUMIN SERPL-MCNC: 4.7 G/DL (ref 3.6–5.1)
ALP SERPL-CCNC: 65 U/L (ref 35–144)
ALT SERPL-CCNC: 14 U/L (ref 9–46)
ANION GAP SERPL CALCULATED.4IONS-SCNC: 12 MMOL/L (CALC) (ref 7–17)
AST SERPL-CCNC: 25 U/L (ref 10–35)
BILIRUB SERPL-MCNC: 1.2 MG/DL (ref 0.2–1.2)
BUN SERPL-MCNC: 39 MG/DL (ref 7–25)
CALCIUM SERPL-MCNC: 10 MG/DL (ref 8.6–10.3)
CHLORIDE SERPL-SCNC: 106 MMOL/L (ref 98–110)
CO2 SERPL-SCNC: 24 MMOL/L (ref 20–32)
CREAT SERPL-MCNC: 1.36 MG/DL (ref 0.7–1.22)
EGFRCR SERPLBLD CKD-EPI 2021: 52 ML/MIN/1.73M2
ERYTHROCYTE [DISTWIDTH] IN BLOOD BY AUTOMATED COUNT: 13.6 % (ref 11–15)
ERYTHROCYTE [SEDIMENTATION RATE] IN BLOOD BY WESTERGREN METHOD: 6 MM/H
GLUCOSE SERPL-MCNC: 131 MG/DL (ref 65–99)
HCT VFR BLD AUTO: 38.9 % (ref 38.5–50)
HGB BLD-MCNC: 12.5 G/DL (ref 13.2–17.1)
MCH RBC QN AUTO: 30.2 PG (ref 27–33)
MCHC RBC AUTO-ENTMCNC: 32.1 G/DL (ref 32–36)
MCV RBC AUTO: 94 FL (ref 80–100)
PLATELET # BLD AUTO: 602 THOUSAND/UL (ref 140–400)
PMV BLD REES-ECKER: 9.7 FL (ref 7.5–12.5)
POTASSIUM SERPL-SCNC: 4.6 MMOL/L (ref 3.5–5.3)
PROT SERPL-MCNC: 7 G/DL (ref 6.1–8.1)
RBC # BLD AUTO: 4.14 MILLION/UL (ref 4.2–5.8)
SODIUM SERPL-SCNC: 142 MMOL/L (ref 135–146)
T PALLIDUM AB SER QL IA: NORMAL
TSH SERPL-ACNC: 1.23 MIU/L (ref 0.4–4.5)
WBC # BLD AUTO: 6.4 THOUSAND/UL (ref 3.8–10.8)

## 2025-06-09 LAB
ALBUMIN SERPL-MCNC: 4.7 G/DL (ref 3.6–5.1)
ALP SERPL-CCNC: 65 U/L (ref 35–144)
ALT SERPL-CCNC: 14 U/L (ref 9–46)
ANION GAP SERPL CALCULATED.4IONS-SCNC: 12 MMOL/L (CALC) (ref 7–17)
AST SERPL-CCNC: 25 U/L (ref 10–35)
BILIRUB SERPL-MCNC: 1.2 MG/DL (ref 0.2–1.2)
BUN SERPL-MCNC: 39 MG/DL (ref 7–25)
CALCIUM SERPL-MCNC: 10 MG/DL (ref 8.6–10.3)
CHLORIDE SERPL-SCNC: 106 MMOL/L (ref 98–110)
CO2 SERPL-SCNC: 24 MMOL/L (ref 20–32)
CREAT SERPL-MCNC: 1.36 MG/DL (ref 0.7–1.22)
EGFRCR SERPLBLD CKD-EPI 2021: 52 ML/MIN/1.73M2
ERYTHROCYTE [DISTWIDTH] IN BLOOD BY AUTOMATED COUNT: 13.6 % (ref 11–15)
ERYTHROCYTE [SEDIMENTATION RATE] IN BLOOD BY WESTERGREN METHOD: 6 MM/H
GLUCOSE SERPL-MCNC: 131 MG/DL (ref 65–99)
HCT VFR BLD AUTO: 38.9 % (ref 38.5–50)
HGB BLD-MCNC: 12.5 G/DL (ref 13.2–17.1)
MCH RBC QN AUTO: 30.2 PG (ref 27–33)
MCHC RBC AUTO-ENTMCNC: 32.1 G/DL (ref 32–36)
MCV RBC AUTO: 94 FL (ref 80–100)
PLATELET # BLD AUTO: 602 THOUSAND/UL (ref 140–400)
PMV BLD REES-ECKER: 9.7 FL (ref 7.5–12.5)
POTASSIUM SERPL-SCNC: 4.6 MMOL/L (ref 3.5–5.3)
PROT SERPL-MCNC: 7 G/DL (ref 6.1–8.1)
RBC # BLD AUTO: 4.14 MILLION/UL (ref 4.2–5.8)
SODIUM SERPL-SCNC: 142 MMOL/L (ref 135–146)
T PALLIDUM AB SER QL IA: NEGATIVE
TSH SERPL-ACNC: 1.23 MIU/L (ref 0.4–4.5)
WBC # BLD AUTO: 6.4 THOUSAND/UL (ref 3.8–10.8)

## 2025-06-10 DIAGNOSIS — R79.89 HIGH PLATELET COUNT: Primary | ICD-10-CM

## 2025-06-13 ENCOUNTER — TELEPHONE (OUTPATIENT)
Dept: PRIMARY CARE | Facility: CLINIC | Age: 80
End: 2025-06-13
Payer: MEDICARE

## 2025-06-13 NOTE — TELEPHONE ENCOUNTER
Spoke with patient. He was mailed his results and number to schedule with . I was not told to call him and mychart showed he read the message from Dr. Arango. Pt states his wife looks at that more. Read him his lab results note from Dr. Arango. Has appt scheduled for hematology. No further questions at this time.

## 2025-06-13 NOTE — TELEPHONE ENCOUNTER
Pt called requesting a call from the office explaining why [facility] put a referral in for hematology. Stated he got a call from them and not sure why. Pt is concerned.

## 2025-06-23 ENCOUNTER — HOSPITAL ENCOUNTER (OUTPATIENT)
Dept: RADIOLOGY | Facility: CLINIC | Age: 80
Discharge: HOME | End: 2025-06-23
Payer: MEDICARE

## 2025-06-23 DIAGNOSIS — G31.84 MILD COGNITIVE IMPAIRMENT: ICD-10-CM

## 2025-06-23 PROCEDURE — 70551 MRI BRAIN STEM W/O DYE: CPT | Performed by: RADIOLOGY

## 2025-06-23 PROCEDURE — 70551 MRI BRAIN STEM W/O DYE: CPT

## 2025-08-01 ENCOUNTER — LAB (OUTPATIENT)
Dept: LAB | Facility: CLINIC | Age: 80
End: 2025-08-01
Payer: MEDICARE

## 2025-08-01 ENCOUNTER — OFFICE VISIT (OUTPATIENT)
Dept: HEMATOLOGY/ONCOLOGY | Facility: CLINIC | Age: 80
End: 2025-08-01
Payer: MEDICARE

## 2025-08-01 VITALS
TEMPERATURE: 97 F | DIASTOLIC BLOOD PRESSURE: 72 MMHG | BODY MASS INDEX: 20.9 KG/M2 | HEIGHT: 75 IN | WEIGHT: 168.1 LBS | OXYGEN SATURATION: 97 % | RESPIRATION RATE: 18 BRPM | HEART RATE: 58 BPM | SYSTOLIC BLOOD PRESSURE: 182 MMHG

## 2025-08-01 DIAGNOSIS — D47.3 ESSENTIAL THROMBOCYTOSIS: Primary | ICD-10-CM

## 2025-08-01 DIAGNOSIS — D47.3 ESSENTIAL THROMBOCYTOSIS: ICD-10-CM

## 2025-08-01 LAB
ALBUMIN SERPL BCP-MCNC: 4.6 G/DL (ref 3.4–5)
ALP SERPL-CCNC: 62 U/L (ref 33–136)
ALT SERPL W P-5'-P-CCNC: 16 U/L (ref 10–52)
ANION GAP SERPL CALC-SCNC: 13 MMOL/L (ref 10–20)
AST SERPL W P-5'-P-CCNC: 24 U/L (ref 9–39)
BASOPHILS # BLD AUTO: 0.06 X10*3/UL (ref 0–0.1)
BASOPHILS NFR BLD AUTO: 0.9 %
BILIRUB SERPL-MCNC: 1.1 MG/DL (ref 0–1.2)
BUN SERPL-MCNC: 32 MG/DL (ref 6–23)
CALCIUM SERPL-MCNC: 9.8 MG/DL (ref 8.6–10.3)
CHLORIDE SERPL-SCNC: 107 MMOL/L (ref 98–107)
CO2 SERPL-SCNC: 28 MMOL/L (ref 21–32)
CREAT SERPL-MCNC: 1.29 MG/DL (ref 0.5–1.3)
EGFRCR SERPLBLD CKD-EPI 2021: 56 ML/MIN/1.73M*2
EOSINOPHIL # BLD AUTO: 0.26 X10*3/UL (ref 0–0.4)
EOSINOPHIL NFR BLD AUTO: 4 %
ERYTHROCYTE [DISTWIDTH] IN BLOOD BY AUTOMATED COUNT: 15.1 % (ref 11.5–14.5)
FERRITIN SERPL-MCNC: 160 NG/ML (ref 20–300)
GLUCOSE SERPL-MCNC: 115 MG/DL (ref 74–99)
HCT VFR BLD AUTO: 39.6 % (ref 41–52)
HGB BLD-MCNC: 13 G/DL (ref 13.5–17.5)
HGB RETIC QN: 34 PG (ref 28–38)
IMM GRANULOCYTES # BLD AUTO: 0.02 X10*3/UL (ref 0–0.5)
IMM GRANULOCYTES NFR BLD AUTO: 0.3 % (ref 0–0.9)
IMMATURE RETIC FRACTION: 3.3 %
IRON SATN MFR SERPL: 28 % (ref 25–45)
IRON SERPL-MCNC: 96 UG/DL (ref 35–150)
LDH SERPL L TO P-CCNC: 238 U/L (ref 84–246)
LYMPHOCYTES # BLD AUTO: 1.08 X10*3/UL (ref 0.8–3)
LYMPHOCYTES NFR BLD AUTO: 16.7 %
MCH RBC QN AUTO: 30.2 PG (ref 26–34)
MCHC RBC AUTO-ENTMCNC: 32.8 G/DL (ref 32–36)
MCV RBC AUTO: 92 FL (ref 80–100)
MONOCYTES # BLD AUTO: 0.63 X10*3/UL (ref 0.05–0.8)
MONOCYTES NFR BLD AUTO: 9.8 %
NEUTROPHILS # BLD AUTO: 4.41 X10*3/UL (ref 1.6–5.5)
NEUTROPHILS NFR BLD AUTO: 68.3 %
NRBC BLD-RTO: 0 /100 WBCS (ref 0–0)
PLATELET # BLD AUTO: 537 X10*3/UL (ref 150–450)
POTASSIUM SERPL-SCNC: 3.9 MMOL/L (ref 3.5–5.3)
PROT SERPL-MCNC: 7.2 G/DL (ref 6.4–8.2)
RBC # BLD AUTO: 4.31 X10*6/UL (ref 4.5–5.9)
RETICS #: 0.06 X10*6/UL (ref 0.02–0.11)
RETICS/RBC NFR AUTO: 1.3 % (ref 0.5–2)
SODIUM SERPL-SCNC: 144 MMOL/L (ref 136–145)
TIBC SERPL-MCNC: 347 UG/DL (ref 240–445)
UIBC SERPL-MCNC: 251 UG/DL (ref 110–370)
WBC # BLD AUTO: 6.5 X10*3/UL (ref 4.4–11.3)

## 2025-08-01 PROCEDURE — 83540 ASSAY OF IRON: CPT

## 2025-08-01 PROCEDURE — 99214 OFFICE O/P EST MOD 30 MIN: CPT | Mod: 25 | Performed by: STUDENT IN AN ORGANIZED HEALTH CARE EDUCATION/TRAINING PROGRAM

## 2025-08-01 PROCEDURE — 80053 COMPREHEN METABOLIC PANEL: CPT

## 2025-08-01 PROCEDURE — 85045 AUTOMATED RETICULOCYTE COUNT: CPT

## 2025-08-01 PROCEDURE — G2211 COMPLEX E/M VISIT ADD ON: HCPCS | Performed by: STUDENT IN AN ORGANIZED HEALTH CARE EDUCATION/TRAINING PROGRAM

## 2025-08-01 PROCEDURE — 99204 OFFICE O/P NEW MOD 45 MIN: CPT | Performed by: STUDENT IN AN ORGANIZED HEALTH CARE EDUCATION/TRAINING PROGRAM

## 2025-08-01 PROCEDURE — 82728 ASSAY OF FERRITIN: CPT

## 2025-08-01 PROCEDURE — 83615 LACTATE (LD) (LDH) ENZYME: CPT

## 2025-08-01 PROCEDURE — 85025 COMPLETE CBC W/AUTO DIFF WBC: CPT

## 2025-08-01 PROCEDURE — 3078F DIAST BP <80 MM HG: CPT | Performed by: STUDENT IN AN ORGANIZED HEALTH CARE EDUCATION/TRAINING PROGRAM

## 2025-08-01 PROCEDURE — 1159F MED LIST DOCD IN RCRD: CPT | Performed by: STUDENT IN AN ORGANIZED HEALTH CARE EDUCATION/TRAINING PROGRAM

## 2025-08-01 PROCEDURE — 3077F SYST BP >= 140 MM HG: CPT | Performed by: STUDENT IN AN ORGANIZED HEALTH CARE EDUCATION/TRAINING PROGRAM

## 2025-08-01 PROCEDURE — 1126F AMNT PAIN NOTED NONE PRSNT: CPT | Performed by: STUDENT IN AN ORGANIZED HEALTH CARE EDUCATION/TRAINING PROGRAM

## 2025-08-01 PROCEDURE — 36415 COLL VENOUS BLD VENIPUNCTURE: CPT

## 2025-08-01 ASSESSMENT — PAIN SCALES - GENERAL: PAINLEVEL_OUTOF10: 0-NO PAIN

## 2025-08-01 NOTE — PROGRESS NOTES
Patient here for  visit with Dr. Long for high platelet count.  Patient here with his wife and daughter.     Medications and Allergies reviewed and reconciled this visit.    No concerns or complaints noted at this time.     Pt reports appetite is good .     Labs done today.  Follow up per MD request in 2 weeks with a virtual visit.    Pt. reports availability and use of mychart, Reviewed this is a good place to communicate with the team as well as review labs and upcoming orders.     No barriers to education noted, patient agrees to current plan and verbalized understanding using teach back method.

## 2025-08-06 LAB
ELECTRONICALLY SIGNED BY: NORMAL
MYELOID NGS RESULTS: NORMAL

## 2025-08-06 NOTE — Clinical Note
Please copy the form, fax back with [facility] list to [phone] or email to Citlali Hopper -- [email or web address]

## 2025-08-14 ENCOUNTER — TELEPHONE (OUTPATIENT)
Dept: PRIMARY CARE | Facility: CLINIC | Age: 80
End: 2025-08-14
Payer: MEDICARE

## 2025-08-15 ENCOUNTER — APPOINTMENT (OUTPATIENT)
Dept: DERMATOLOGY | Facility: CLINIC | Age: 80
End: 2025-08-15
Payer: MEDICARE

## 2025-08-15 DIAGNOSIS — L82.1 SEBORRHEIC KERATOSIS: ICD-10-CM

## 2025-08-15 DIAGNOSIS — L30.9 DERMATITIS: ICD-10-CM

## 2025-08-15 DIAGNOSIS — L90.5 SCAR CONDITIONS AND FIBROSIS OF SKIN: ICD-10-CM

## 2025-08-15 DIAGNOSIS — Z85.828 HISTORY OF NONMELANOMA SKIN CANCER: ICD-10-CM

## 2025-08-15 DIAGNOSIS — L57.9 SKIN CHANGES DUE TO CHRONIC EXPOSURE TO NONIONIZING RADIATION: ICD-10-CM

## 2025-08-15 DIAGNOSIS — D22.9 BENIGN NEVUS: ICD-10-CM

## 2025-08-15 DIAGNOSIS — L81.4 LENTIGO: ICD-10-CM

## 2025-08-15 DIAGNOSIS — D18.01 ANGIOMA OF SKIN: ICD-10-CM

## 2025-08-15 DIAGNOSIS — D48.5 NEOPLASM OF UNCERTAIN BEHAVIOR OF SKIN: ICD-10-CM

## 2025-08-15 PROCEDURE — 1159F MED LIST DOCD IN RCRD: CPT | Performed by: NURSE PRACTITIONER

## 2025-08-15 PROCEDURE — 11102 TANGNTL BX SKIN SINGLE LES: CPT | Performed by: NURSE PRACTITIONER

## 2025-08-15 PROCEDURE — 1160F RVW MEDS BY RX/DR IN RCRD: CPT | Performed by: NURSE PRACTITIONER

## 2025-08-15 PROCEDURE — 99213 OFFICE O/P EST LOW 20 MIN: CPT | Performed by: NURSE PRACTITIONER

## 2025-08-20 ENCOUNTER — TELEPHONE (OUTPATIENT)
Dept: HEMATOLOGY/ONCOLOGY | Facility: CLINIC | Age: 80
End: 2025-08-20
Payer: MEDICARE

## 2025-08-20 DIAGNOSIS — C41.1 SQUAMOUS CELL CARCINOMA OF MANDIBLE: ICD-10-CM

## 2025-08-22 ENCOUNTER — TELEMEDICINE (OUTPATIENT)
Dept: HEMATOLOGY/ONCOLOGY | Facility: CLINIC | Age: 80
End: 2025-08-22
Payer: MEDICARE

## 2025-08-22 DIAGNOSIS — D47.3 ESSENTIAL THROMBOCYTOSIS: ICD-10-CM

## 2025-08-22 PROCEDURE — 99214 OFFICE O/P EST MOD 30 MIN: CPT | Performed by: STUDENT IN AN ORGANIZED HEALTH CARE EDUCATION/TRAINING PROGRAM

## 2025-08-22 NOTE — PROGRESS NOTES
Patient ID: Jhonny Baron is a 79 y.o. male.  Referring Physician: Valdo Arango MD  [address] [facility]  [address]  [place] [address]  Primary Care Provider: Valdo Arango MD  Referral Reason: thrombocytosis    HPI:  Medardo is a 79 years old male with history of hypertension, diabetic and Atrial fibrillation on Xarelto, skin cancer BCC and SCC on face and upper extremity, Who was referred for thrombocytosis.  Patient is doing well he denies fatigue, weight loss, night sweat, abdominal discomfort, and bleeding. Reviewing patient labs, platelet counts has been elevated since 2022 highest 769, hemoglobin used to be 16 and trending downward around 13, white blood cell count is normal.       Past Medical History: Medical History[1]  Social History:   Social History     Socioeconomic History    Marital status:      Spouse name: Not on file    Number of children: Not on file    Years of education: Not on file    Highest education level: Not on file   Occupational History    Not on file   Tobacco Use    Smoking status: Former     Types: Cigars     Passive exposure: Past    Smokeless tobacco: Never   Vaping Use    Vaping status: Never Used   Substance and Sexual Activity    Alcohol use: Yes     Comment: 1-2 beers per week    Drug use: Never    Sexual activity: Defer     Partners: Female   Other Topics Concern    Not on file   Social History Narrative    Not on file     Social Drivers of Health     Financial Resource Strain: Not on file   Food Insecurity: Not on file   Transportation Needs: Not on file   Physical Activity: Not on file   Stress: Not on file   Social Connections: Not on file   Intimate Partner Violence: Not on file   Housing Stability: Not on file     Surgical History: Surgical History[2]  Family History: Family History[3]   reports that he has quit smoking. His smoking use included cigars. He has been exposed to tobacco smoke. He has never used smokeless tobacco.  Oncology Family history:  "Cancer-related family history is not on file.    Review Of Systems:  General: no fatigue, weight change, appetite change  Ears/Nose/Mouth/Throat: no mouth sore, no hearing loss, no nasal congestion, no sore throat  Cardiovascular: no chest pain, no shortness of breath, no palpitation  Pulmonary: no cough, no wheezing, no hemoptysis  GI: no nausea, no vomiting, no diarrhea or constipation, no bleeding per rectum  Neurological: no dizziness, no seizure, no weakness, no sensation change  Musculoskeletal: no joint swelling, no bone pain, no back pain  Skin: no skin rash, no bruising, no skin lesion      Physical Exam:  BP (!) 182/72 (BP Location: Right arm, Patient Position: Sitting, BP Cuff Size: Adult)   Pulse 58   Temp 36.1 °C (97 °F) (Temporal)   Resp 18   Ht 1.905 m (6' 3\")   Wt 76.2 kg (168 lb 1.6 oz)   SpO2 97%   BMI 21.01 kg/m²   BSA: 2.01 meters squared  General: awake/alert/oriented x3, no distress  Head: atraumatic. Symmetric facial expressions  Eyes: PERRL, EOMI, clear sclera.  Ears/Nose/Mouth/Throat:  Oral mucous membranes moist. No oral ulcers  Neck: No palpable cervical chain lymph nodes  Respiratory: unlabored breathing on room air, good chest expansion, clear breath sounds on both sides, no ronchi  Cardio: Regular rate and rhythm, normal S1 and S2, radial pulses symmetric  GI: Nondistended, soft, non-tender abdomen  Musculoskeletal: Normal muscle bulk and tone, ROM intact, no joint swelling.  Extremities: No pedal edema, no arm or leg wounds  Neuro: Alert, cognition intact, speech normal. No motor deficits noted. Sensation intact to touch and hot/cold.   Psychological: Appropriate mood and behavior.  Skin: Warm and dry, no lesions, no rashes    Results:  Diagnostic Results   Lab Results   Component Value Date    WBC 6.5 08/01/2025    HGB 13.0 (L) 08/01/2025    HCT 39.6 (L) 08/01/2025    MCV 92 08/01/2025     (H) 08/01/2025     Lab Results   Component Value Date    CALCIUM 9.8 08/01/2025    "  08/01/2025    K 3.9 08/01/2025    CO2 28 08/01/2025     08/01/2025    BUN 32 (H) 08/01/2025    CREATININE 1.29 08/01/2025    ALT 16 08/01/2025    AST 24 08/01/2025     Current Medications[4]     Radiology:  [unfilled]     Pathology:    Assessment/Plan:    Thrombocytosis  - Reviewing patient labs, platelet counts has been elevated since 2022 highest 769, hemoglobin used to be 16 and trending downward around 13, white blood cell count is normal.   - will check CBC<CMP, LDH, iron studies, and myeloid NGS panel to rule out MPN  - Reports he is moving to [place], willing to follow-up at closer location  - 2 weeks virtual follow up    Performance Status: Symptomatic; fully ambulatory    I spent more than 60 minutes for the patient today, including face-to-face conversation, pre-visit preparation, post-visit orders, and others.   Sierra Long MD                              [1]   Past Medical History:  Diagnosis Date    A-fib (Multi)     Alcohol abuse, uncomplicated     Alcohol abuse, episodic    Anxiety disorder, unspecified     Anxiety    Dermatochalasis of unspecified eye, unspecified eyelid 01/06/2020    Dermatochalasis    Dermatochalasis of unspecified eye, unspecified eyelid 01/06/2020    Dermatochalasis    Dermatochalasis of unspecified eye, unspecified eyelid 01/06/2020    Dermatochalasis    DM type 2 (diabetes mellitus, type 2) (Multi)     HTN (hypertension)     Left inguinal hernia 03/22/2024 5/2024 s/p IHR ([facility])      Male erectile dysfunction, unspecified     Inability to attain erection    Other conditions influencing health status     Prostate Cancer    Other specified diseases of liver     Hepatic cyst    Palpitations     Palpitations    Personal history of other diseases of male genital organs     History of benign prostatic hypertrophy    Personal history of other diseases of the circulatory system     History of hypertension    Personal history of other endocrine, nutritional and  metabolic disease     History of hypercholesterolemia    Pinguecula, right eye 01/06/2020    Pinguecula of right eye    Pinguecula, right eye 01/06/2020    Pinguecula of right eye    Pinguecula, right eye 01/06/2020    Pinguecula of right eye    Prostate cancer (Multi) 2017    Pure hypercholesterolemia, unspecified 07/22/2022    High cholesterol   [2]   Past Surgical History:  Procedure Laterality Date    COLONOSCOPY N/A 2018    [facility]    COLONOSCOPY N/A 2006    Complete Colonoscopy    HERNIA REPAIR      Hernia Repair    OTHER SURGICAL HISTORY  11/27/2012    Renal Endoscopy Through Nephrostomy With Calculus Removal    PROSTATE SURGERY  11/27/2012    Prostate Surgery    TONSILLECTOMY  11/27/2012    Tonsillectomy   [3]   Family History  Problem Relation Name Age of Onset    Hypertension Mother      Hypertension Father     [4]   Current Outpatient Medications:     clobetasol (Temovate) 0.05 % external solution, Apply to affected areas twice daily on the scalp for 2 weeks then daily x1 week then every other day x1 week then as needed. When using as needed, use less than 14 days per month., Disp: 50 mL, Rfl: 2    cyanocobalamin (Vitamin B-12) 100 mcg tablet, Take 1 tablet (100 mcg) by mouth once daily., Disp: , Rfl:     folic acid/multivit-min/lutein (CENTRUM SILVER ORAL), Take 1 tablet by mouth once daily., Disp: , Rfl:     gemfibrozil (Lopid) 600 mg tablet, Take 1 tablet (600 mg) by mouth 2 times a day., Disp: 180 tablet, Rfl: 3    hydrocortisone 2.5 % cream, Apply to affected areas twice daily to the face/arms for 2 weeks then daily for 1 week then every other day for 1 week then stop. Then may used as needed when active. When using for maintenance, use less than 14 days per month., Disp: 454 g, Rfl: 1    losartan (Cozaar) 100 mg tablet, Take 1 tablet (100 mg) by mouth once daily., Disp: 90 tablet, Rfl: 3    melatonin 5 mg tablet,chewable, Chew 5 mg once daily at bedtime., Disp: 90 tablet, Rfl: 3    metFORMIN   mg 24 hr tablet, Take 1 tablet (500 mg) by mouth once daily with breakfast. Do not crush, chew, or split., Disp: 90 tablet, Rfl: 3    metoprolol tartrate (Lopressor) 100 mg tablet, Take 0.5 tablets (50 mg) by mouth 2 times a day. No further refills until seen in office for appointment, Disp: 30 tablet, Rfl: 11    neomycin-polymyxin-HC (Cortisporin) 3.5-10,000-1 mg/mL-unit/mL-% otic suspension, Administer 3 drops into affected ear(s) 2 times a day as needed (ear redness, scaly, itchy)., Disp: 10 mL, Rfl: 2    omega 3-dha-epa-fish oil (Fish OiL) 1,000 mg (120 mg-180 mg) capsule, Take by mouth., Disp: , Rfl:     rivaroxaban (Xarelto) 15 mg tablet, Take 1 tablet (15 mg) by mouth once daily in the evening. Take with meals. Take with food., Disp: 60 tablet, Rfl: 5    simvastatin (Zocor) 20 mg tablet, Take 1 tablet (20 mg) by mouth once daily at bedtime., Disp: 90 tablet, Rfl: 3

## 2025-08-28 ENCOUNTER — APPOINTMENT (OUTPATIENT)
Dept: OPHTHALMOLOGY | Facility: CLINIC | Age: 80
End: 2025-08-28
Payer: MEDICARE

## 2025-09-03 ENCOUNTER — APPOINTMENT (OUTPATIENT)
Dept: CARDIOLOGY | Facility: CLINIC | Age: 80
End: 2025-09-03
Payer: MEDICARE

## 2025-09-03 ENCOUNTER — APPOINTMENT (OUTPATIENT)
Dept: PRIMARY CARE | Facility: CLINIC | Age: 80
End: 2025-09-03
Payer: MEDICARE

## 2025-09-10 ENCOUNTER — APPOINTMENT (OUTPATIENT)
Dept: PRIMARY CARE | Facility: CLINIC | Age: 80
End: 2025-09-10
Payer: MEDICARE

## 2025-09-17 ENCOUNTER — APPOINTMENT (OUTPATIENT)
Dept: DERMATOLOGY | Facility: CLINIC | Age: 80
End: 2025-09-17
Payer: MEDICARE

## 2025-10-08 ENCOUNTER — APPOINTMENT (OUTPATIENT)
Dept: OPHTHALMOLOGY | Facility: CLINIC | Age: 80
End: 2025-10-08
Payer: MEDICARE

## 2025-10-13 ENCOUNTER — APPOINTMENT (OUTPATIENT)
Dept: OPHTHALMOLOGY | Facility: CLINIC | Age: 80
End: 2025-10-13
Payer: MEDICARE

## 2026-01-16 ENCOUNTER — APPOINTMENT (OUTPATIENT)
Dept: OPHTHALMOLOGY | Facility: CLINIC | Age: 81
End: 2026-01-16
Payer: MEDICARE

## 2026-02-16 ENCOUNTER — APPOINTMENT (OUTPATIENT)
Dept: DERMATOLOGY | Facility: CLINIC | Age: 81
End: 2026-02-16
Payer: MEDICARE

## 2026-02-20 ENCOUNTER — APPOINTMENT (OUTPATIENT)
Dept: PRIMARY CARE | Facility: CLINIC | Age: 81
End: 2026-02-20
Payer: MEDICARE

## (undated) DEVICE — MARKING PEN, REGULAR, W/TUO RUL & LA

## (undated) DEVICE — SUTURE, PDS II, 3-0, 27 IN, SH, VIL, MONO, LF

## (undated) DEVICE — SUTURE, VICRYL, 3-0, 54 IN, CTD, UNDYED

## (undated) DEVICE — SUTURE, VICRYL, 3-0, 27 IN, SH, VIOLET

## (undated) DEVICE — BLADE, SURGICAL, POLYMER COATED P15, STERILE, DISP

## (undated) DEVICE — SLEEVE, VASO PRESS, CALF GARMENT, MEDIUM, GREEN

## (undated) DEVICE — DRAPE, SHEET, LAPAROTOMY, TRANSVERSE, W/FENESTRATION, 77 X 122 IN, DISPOSABLE, LF, STERILE

## (undated) DEVICE — VESSEL LOOP, RED MAXI, 2 CARD

## (undated) DEVICE — STRIP, SKIN CLOSURE, COMPOUND BENZION TINCTURE 0.6ML

## (undated) DEVICE — Device

## (undated) DEVICE — TIP, SUCTION, YANKAUER, BULB, ADULT

## (undated) DEVICE — DRESSING, WOUND, PRIMAPORE, 6 X 3 1/8

## (undated) DEVICE — SUTURE, MONOCRYL, 4-0, 27 IN, PS-2, UNDYED

## (undated) DEVICE — SUTURE, VICRYL, 3-0, 27 IN, CT-2, UNDYED

## (undated) DEVICE — SOLUTION, IRRIGATION, X RX SODIUM CHL 0.9%, 1000ML BTL

## (undated) DEVICE — STRIP, SKIN CLOSURE, STERI STRIP, REINFORCED, 0.5 X 4 IN

## (undated) DEVICE — SUTURE, VICRYL, 0, 27 IN, CT-2, UNDYED

## (undated) DEVICE — ELECTRODE, ELECTROSURGICAL, BLADE, INSULATED, ENT/IMA, STERILE

## (undated) DEVICE — GLOVE, PROTEXIS PI CLASSIC, SZ-6.5, PF, LF